# Patient Record
Sex: FEMALE | Race: WHITE | NOT HISPANIC OR LATINO | Employment: OTHER | URBAN - METROPOLITAN AREA
[De-identification: names, ages, dates, MRNs, and addresses within clinical notes are randomized per-mention and may not be internally consistent; named-entity substitution may affect disease eponyms.]

---

## 2018-05-18 ENCOUNTER — OFFICE VISIT CONVERTED (OUTPATIENT)
Dept: CARDIOLOGY | Facility: CLINIC | Age: 58
End: 2018-05-18
Attending: INTERNAL MEDICINE

## 2018-11-30 ENCOUNTER — OFFICE VISIT CONVERTED (OUTPATIENT)
Dept: CARDIOLOGY | Facility: CLINIC | Age: 58
End: 2018-11-30
Attending: INTERNAL MEDICINE

## 2019-01-12 ENCOUNTER — HOSPITAL ENCOUNTER (OUTPATIENT)
Dept: GENERAL RADIOLOGY | Facility: HOSPITAL | Age: 59
Discharge: HOME | End: 2019-01-12
Attending: FAMILY MEDICINE

## 2019-01-18 ENCOUNTER — HOSPITAL ENCOUNTER (OUTPATIENT)
Dept: OTHER | Facility: HOSPITAL | Age: 59
Discharge: HOME | End: 2019-01-18
Attending: INTERNAL MEDICINE

## 2019-01-18 LAB
ANION GAP SERPL CALC-SCNC: 15 MMOL/L (ref 8–19)
BUN SERPL-MCNC: 18 MG/DL (ref 5–25)
BUN/CREAT SERPL: 22 {RATIO} (ref 6–20)
CALCIUM SERPL-MCNC: 8.8 MG/DL (ref 8.7–10.4)
CHLORIDE SERPL-SCNC: 102 MMOL/L (ref 99–111)
CONV CO2: 29 MMOL/L (ref 22–32)
CREAT UR-MCNC: 0.81 MG/DL (ref 0.5–0.9)
GFR SERPLBLD BASED ON 1.73 SQ M-ARVRAT: >60 ML/MIN/{1.73_M2}
GLUCOSE SERPL-MCNC: 87 MG/DL (ref 65–99)
OSMOLALITY SERPL CALC.SUM OF ELEC: 295 MOSM/KG (ref 273–304)
POTASSIUM SERPL-SCNC: 3.8 MMOL/L (ref 3.5–5.3)
SODIUM SERPL-SCNC: 142 MMOL/L (ref 135–147)

## 2019-06-05 ENCOUNTER — OFFICE VISIT CONVERTED (OUTPATIENT)
Dept: CARDIOLOGY | Facility: CLINIC | Age: 59
End: 2019-06-05
Attending: INTERNAL MEDICINE

## 2019-06-05 ENCOUNTER — CONVERSION ENCOUNTER (OUTPATIENT)
Dept: CARDIOLOGY | Facility: CLINIC | Age: 59
End: 2019-06-05

## 2019-06-18 ENCOUNTER — OFFICE VISIT CONVERTED (OUTPATIENT)
Dept: PODIATRY | Facility: CLINIC | Age: 59
End: 2019-06-18
Attending: PODIATRIST

## 2019-12-10 ENCOUNTER — CONVERSION ENCOUNTER (OUTPATIENT)
Dept: CARDIOLOGY | Facility: CLINIC | Age: 59
End: 2019-12-10

## 2019-12-10 ENCOUNTER — OFFICE VISIT CONVERTED (OUTPATIENT)
Dept: CARDIOLOGY | Facility: CLINIC | Age: 59
End: 2019-12-10
Attending: INTERNAL MEDICINE

## 2020-04-15 ENCOUNTER — TELEMEDICINE CONVERTED (OUTPATIENT)
Dept: CARDIOLOGY | Facility: CLINIC | Age: 60
End: 2020-04-15
Attending: INTERNAL MEDICINE

## 2020-06-05 ENCOUNTER — OFFICE VISIT CONVERTED (OUTPATIENT)
Dept: CARDIOLOGY | Facility: CLINIC | Age: 60
End: 2020-06-05
Attending: INTERNAL MEDICINE

## 2020-06-05 ENCOUNTER — CONVERSION ENCOUNTER (OUTPATIENT)
Dept: CARDIOLOGY | Facility: CLINIC | Age: 60
End: 2020-06-05

## 2020-09-18 ENCOUNTER — CONVERSION ENCOUNTER (OUTPATIENT)
Dept: GASTROENTEROLOGY | Facility: CLINIC | Age: 60
End: 2020-09-18
Attending: INTERNAL MEDICINE

## 2020-09-21 ENCOUNTER — HOSPITAL ENCOUNTER (OUTPATIENT)
Dept: PREADMISSION TESTING | Facility: HOSPITAL | Age: 60
Discharge: HOME | End: 2020-09-21
Attending: INTERNAL MEDICINE

## 2020-09-22 LAB — SARS-COV-2 RNA SPEC QL NAA+PROBE: NOT DETECTED

## 2020-09-25 ENCOUNTER — HOSPITAL ENCOUNTER (OUTPATIENT)
Dept: GASTROENTEROLOGY | Facility: HOSPITAL | Age: 60
Setting detail: HOSPITAL OUTPATIENT SURGERY
Discharge: HOME | End: 2020-09-25
Attending: INTERNAL MEDICINE

## 2020-12-10 ENCOUNTER — TELEMEDICINE CONVERTED (OUTPATIENT)
Dept: CARDIOLOGY | Facility: CLINIC | Age: 60
End: 2020-12-10
Attending: INTERNAL MEDICINE

## 2021-05-10 NOTE — H&P
History and Physical      Patient Name: Susana Kim   Patient ID: [id number]   Sex: Female   YOB: 1960    Primary Care Provider: Adam Alcocer MD   Referring Provider: Krystina Potter MD    Visit Date: 2020    Provider: Alexei Palma MD   Location: [facility]   Location Address: [address] [place] [address]  [id number]   Location Phone: [phone]          Chief Complaint  · Surgical History and Physical  · Screening Colonoscopy      History Of Present Illness  NON-INPATIENT HISTORY AND PHYSICAL  Allergies: NO KNOWN DRUG ALLERGIES   Chief Complaint/History of Present Illness: EGD for Dysphagia and Screening Colonoscopy, History of Colon Polyps   Colon Recall: Yes How Lon years   Failed Outpatient Treatment/Contraindications: N/A   Current Medications: diltiazem HCl 180 mg oral capsule,extended release 24 hr, Eliquis 5 mg oral tablet, and Suprep Bowel Prep Kit 17.5-3.13-1.6 gram oral recon soln   Significant Past Medical History: Atrial Fibrillation, Bunion, and Hypertension   Significant Family Medical History: No Family History of Colon Cancer   Significant Past Surgical History: Cesarian Section and Colonoscopy   Previous Colonoscopy: Yes YEAR:  By Whom: Alexei Palma MD   Previous EGD: No   PHYSICAL EXAM:  Heart: Regular Rate and Rhythm   Lungs: Breathing Unlabored           Assessment  · Preoperative examination     V72.84/Z01.818  · Screening for colon cancer     V76.51/Z12.11  · Dysphagia     787.20/R13.10  · History of colon polyps     V12.72/Z86.010  · Weight loss     783.21/R63.4  · GERD (gastroesophageal reflux disease)     530.81/K21.9      Plan  · Orders  o Consent for Esophagogastroduodenoscopy (EGD) with biopsy - Possible risks/complications, benefits, and alternatives to surgical or invasive procedure have been explained to patient and/or legal guardian. - Patient has been evaluated and can tolerate anesthesia and/or sedation.  Risks, benefits, and alternatives to anesthesia and sedation have been explained to patient and/or legal guardian. (98207) - 787.20/R13.10, 783.21/R63.4, 530.81/K21.9 - 09/25/2020  o Consent for Colonoscopy Screening -Possible risk/complications, benefits, and alternatives to surgical or invasive procedure have been explained to patient and/or legal gaurdian. -Patient has been evaluated and can tolerate anethesia and/or sedation. Risk, benefits, and alternatives to anesthesia and sedation have been explained to patient or legal gaurdian. () - V12.72/Z86.010, V76.51/Z12.11 - 09/25/2020  · Medications  o Medications have been Reconciled  o Transition of Care or Provider Policy  · Instructions  o ****Surgical Orders****  o ***************  o Outpatient  o ***************  o RISK AND BENEFITS:  o Possible risks/complications, benefits and alternatives to surgical or invasive procedure have been explained to the patient and/or legal guardian.  o Patient has been evaluated and can tolerate anesthesia and/or sedation. Risks, benefits, and alternatives to anesthesia and sedation have been explained to the patient and/or legal guardian.  o ***************  o PREP: Per protocol  o IV: Per Anesthesia  o The above History and Physical Examination has been completed within 30 days of admission.  o This note has been transcribed by JOSÉ MIGUEL Redd MA. I have read and agree with the findings in this note.  o Electronically Identified Patient Education Materials Provided Electronically  · Disposition  o Call or Return if symptoms worsen or persist.            Electronically Signed by: Claudoi Redd, -Author on September 18, 2020 11:27:06 AM

## 2021-05-12 NOTE — PROGRESS NOTES
Progress Note      Patient Name: Susana Kim   Patient ID: [id number]   Sex: Female   YOB: 1960    Primary Care Provider: Adam Alcocer MD   Referring Provider: Krystina Potter MD    Visit Date: April 15, 2020    Provider: Juan Potter MD   Location: [facility]   Location Address: [address] [place] [address], [facility]   JOHNATHAN Gomez  [id number]   Location Phone: [phone]          History Of Present Illness  Video Conferencing Visit  Susana Kim is a 59 year old /White female with paroxysmal atrial fibrillation and hypertension, who has been having stable symptoms. She does report pain primarily in the knees, hip and back, which has been chronic. The patient is concerned that this occurred after she was started on Valsartan and it may be related to the medication. She reports occasional atypical chest pain as well. She is presenting for evaluation via video conferencing. Verbal consent obtained before beginning visit.   The following staff were present during this visit: Provider only.   PAST MEDICAL HISTORY: Hypertension; Paroxysmal atrial fibrillation.   FAMILY HISTORY: Negative for diabetes, hypertension and heart disease.   PSYCHOSOCIAL HISTORY: No history of mood changes or depression. She never used alcohol or tobacco.   CURRENT MEDICATIONS: include Eliquis 5 mg b.i.d; Diltiazem 180 mg daily; Valsartan 160 mg daily. The dosage and frequency of the medications were reviewed with the patient.       Review of Systems  · Cardiovascular  o Admits  o : chest pain or angina pectoris   o Denies  o : palpitations (fast, fluttering, or skipping beats), swelling (feet, ankles, hands), shortness of breath while walking or lying flat  · Respiratory  o Admits  o : chronic or frequent cough  o Denies  o : asthma or wheezing      Vitals     Per patient, at-home vitals are blood pressure 134/67, heart rate of 58.  Weight:  157.           Assessment     ASSESSMENT AND  PLAN:    1.  Joint pain:  Patient concerned this may be related to her medication.  Most recent medication added was       Valsartan.  Recommended halving for a week and then coming off of it and then monitoring her blood       pressure to see if any anti-hypertensive therapy is needed.  2.  Paroxysmal atrial fibrillation:  Patient is maintaining normal sinus rhythm. She is on Eliquis for CVA        prevention.    MD Oniel Pinedo/severiano         This note was transcribed by Cierra Kimball.  severiano/oniel   The above service was transcribed by Cierra Kimball, and I attest to the accuracy of the note.  ONIEL.               Electronically Signed by: Cierra Kimball-, -Author on April 23, 2020 04:10:52 AM  Electronically Co-signed by: Juan Potter MD -Reviewer on April 23, 2020 12:53:23 PM

## 2021-05-13 NOTE — PROGRESS NOTES
"   Progress Note      Patient Name: Susana Kim   Patient ID: [id number]   Sex: Female   YOB: 1960    Primary Care Provider: Adam Alcocer MD   Referring Provider: Krystina Potter MD    Visit Date: June 5, 2020    Provider: Juan Potter MD   Location: [facility]   Location Address: [address] [place] [address], [facility]   JOHNATHAN Gomez  [id number]   Location Phone: [phone]          Chief Complaint     Afib.       History Of Present Illness  REFERRING CARE PROVIDER: Krystina Potter MD   Susana Kim is a 59 year old /White female with paroxysmal atrial fibrillation and hypertension who has been doing well. She has not had any new complaints, intermittent palpitations occasionally.   PAST MEDICAL HISTORY: Hypertension; Paroxysmal atrial fibrillation.   FAMILY HISTORY: Negative for diabetes mellitus, hypertension, or heart disease.   PSYCHOSOCIAL HISTORY: Denies alcohol or tobacco use. Walks for exercise. Denies mood changes or depression.   CURRENT MEDICATIONS: Eliquis 5 mg b.i.d.; Cartia  mg daily; multivitamin; calcium. Dosage and frequency of the medications reviewed with the patient.       Review of Systems  · Cardiovascular  o Admits  o : palpitations (fast, fluttering, or skipping beats)  o Denies  o : swelling (feet, ankles, hands), shortness of breath while walking or lying flat, chest pain or angina pectoris   · Respiratory  o Denies  o : chronic or frequent cough, asthma or wheezing      Vitals  Date Time BP Position Site L\R Cuff Size HR RR TEMP (F) WT  HT  BMI kg/m2 BSA m2 O2 Sat HC       06/05/2020 12:27 /78 Sitting    58 - R   149lbs 0oz 5'  7\" 23.34 1.79     06/05/2020 12:27 /76 Sitting    56 - R                 Physical Examination  · Constitutional  o Appearance  o : Awake, alert, in no acute distress.   · Eyes  o Conjunctivae  o : Normal.  · Ears, Nose, Mouth and Throat  o Oral Cavity  o :   § Oral Mucosa  § : " Normal.  · Neck  o Inspection/Palpation  o : No JVD. Good carotid upstroke. No thyromegaly.  · Respiratory  o Respiratory  o : Good respiratory effort. Clear to percussion and auscultation.  · Cardiovascular  o Heart  o :   § Auscultation of Heart  § : S1, S2 normal. Regular rate and rhythm without murmurs, gallops, or rubs.  o Peripheral Vascular System  o :   § Extremities  § : Good femoral and pedal pulses. No pedal edema.  · Gastrointestinal  o Abdominal Examination  o : Soft. No tenderness or masses felt. No hepatosplenomegaly. Abdominal aorta is not palpable.          Assessment     ASSESSMENT & PLAN:    Atrial fibrillation, paroxysmal, maintaining normal sinus rhythm and on Eliquis for CVA prevention.             Electronically Signed by: Elvira Segovia-, Other -Author on June 11, 2020 01:45:19 PM  Electronically Co-signed by: Juan Potter MD -Reviewer on June 17, 2020 05:51:30 PM

## 2021-05-14 NOTE — PROGRESS NOTES
"   Progress Note      Patient Name: Susana Kim   Patient ID: [id number]   Sex: Female   YOB: 1960    Primary Care Provider: Adam Alcocer MD   Referring Provider: Krystina Potter MD    Visit Date: December 10, 2020    Provider: Juan Potter MD   Location: [facility]   Location Address: [address] [place] [address], [address]   JOHNATHAN Gomez  [id number]   Location Phone: [phone]          Chief Complaint     Atrial fibrillation.       History Of Present Illness  Video Conferencing Visit  Susana Kim is a 60 year old /White female with paroxysmal atrial fibrillation and hypertension who has been doing well at home. She has not had any significant recurrent tachycardia issues. Denies chest pain or shortness of breath. Tolerating her medications well. Evaluation via video conferencing. Verbal consent obtained before beginning visit.   The following staff were present during this visit: Provider only.      PAST MEDICAL HISTORY: Mild hypertension; Paroxysmal atrial fibrillation.     CURRENT MEDICATIONS:  Eliquis 5 mg b.i.d.; Cartia  mg daily; omeprazole 40 mg daily; multivitamin daily; calcium daily; glucosamine daily.    ALLERGIES:  No known drug allergies.           Vitals     Per patient, at-home vitals:   /62.  Heart rate 55.  Height 5'7\".           Assessment     ASSESSMENT & PLAN:    Paroxysmal atrial fibrillation.  Patient symptomatically maintaining normal sinus rhythm and on Eliquis for CVA prevention.  Continued to encourage exercise.             Electronically Signed by: Elvira Segovia-, Other -Author on December 18, 2020 11:03:46 AM  Electronically Co-signed by: Juan Potter MD -Reviewer on December 18, 2020 03:13:15 PM  "

## 2021-05-15 VITALS
HEIGHT: 67 IN | BODY MASS INDEX: 24.17 KG/M2 | SYSTOLIC BLOOD PRESSURE: 126 MMHG | DIASTOLIC BLOOD PRESSURE: 58 MMHG | WEIGHT: 154 LBS | HEART RATE: 58 BPM

## 2021-05-15 VITALS
HEIGHT: 67 IN | WEIGHT: 149 LBS | DIASTOLIC BLOOD PRESSURE: 78 MMHG | HEART RATE: 58 BPM | SYSTOLIC BLOOD PRESSURE: 150 MMHG | BODY MASS INDEX: 23.39 KG/M2

## 2021-05-15 VITALS
DIASTOLIC BLOOD PRESSURE: 59 MMHG | WEIGHT: 155 LBS | BODY MASS INDEX: 24.33 KG/M2 | HEIGHT: 67 IN | SYSTOLIC BLOOD PRESSURE: 115 MMHG | HEART RATE: 65 BPM | OXYGEN SATURATION: 99 %

## 2021-05-15 VITALS
HEART RATE: 56 BPM | DIASTOLIC BLOOD PRESSURE: 62 MMHG | SYSTOLIC BLOOD PRESSURE: 126 MMHG | BODY MASS INDEX: 24.48 KG/M2 | WEIGHT: 156 LBS | HEIGHT: 67 IN

## 2021-05-16 VITALS
HEIGHT: 67 IN | DIASTOLIC BLOOD PRESSURE: 70 MMHG | SYSTOLIC BLOOD PRESSURE: 144 MMHG | WEIGHT: 152 LBS | HEART RATE: 56 BPM | BODY MASS INDEX: 23.86 KG/M2

## 2021-05-16 VITALS
SYSTOLIC BLOOD PRESSURE: 138 MMHG | HEART RATE: 50 BPM | WEIGHT: 153 LBS | DIASTOLIC BLOOD PRESSURE: 74 MMHG | HEIGHT: 67 IN | BODY MASS INDEX: 24.01 KG/M2

## 2021-08-16 PROBLEM — I10 ESSENTIAL HYPERTENSION: Status: ACTIVE | Noted: 2021-08-16

## 2021-08-16 PROBLEM — I48.0 PAF (PAROXYSMAL ATRIAL FIBRILLATION): Status: ACTIVE | Noted: 2021-08-16

## 2021-08-16 NOTE — PROGRESS NOTES
"Chief Complaint  Follow-up (6 mos with EKG) and Atrial Fibrillation    Subjective    Patient has had some heart pounding tachycardia episodes which she did record on her home telemetry strips which showed normal sinus rhythm.  Do not report any other active complaints or symptoms    Past Medical History:   Diagnosis Date   • Atrial fibrillation (CMS/HCC)    • Bunion    • Essential hypertension 8/16/2021   • Hypertension    • PAF (paroxysmal atrial fibrillation) (CMS/HCC) 8/16/2021         Current Outpatient Medications:   •  Calcium 500-125 MG-UNIT tablet, Calcium 500 + D (D3) 500 mg(1,250mg) -125 unit oral tablet take 1 tablet by oral route daily   Suspended, Disp: , Rfl:   •  dilTIAZem CD (CARDIZEM CD) 180 MG 24 hr capsule, Take 180 mg by mouth Daily., Disp: , Rfl:   •  Eliquis 5 MG tablet tablet, Take 5 mg by mouth 2 (Two) Times a Day., Disp: , Rfl:   •  multivitamin (multivitamin) tablet tablet, Take  by mouth Daily., Disp: , Rfl:   •  Omega-3 Fatty Acids (OMEGA 3 500 PO), Take  by mouth., Disp: , Rfl:     There are no discontinued medications.  No Known Allergies     Social History     Tobacco Use   • Smoking status: Never Smoker   • Smokeless tobacco: Never Used   Vaping Use   • Vaping Use: Never used   Substance Use Topics   • Alcohol use: Not Currently   • Drug use: Never       Family History   Family history unknown: Yes        Objective     /52   Pulse 62   Ht 170.2 cm (67\")   Wt 69.9 kg (154 lb)   BMI 24.12 kg/m²       Physical Exam    General Appearance:   · no acute distress  · Alert and oriented x3  HENT:   · lips not cyanotic  · Atraumatic  Neck:  · No jvd   · supple  Respiratory:  · no respiratory distress  · normal breath sounds  · no rales  Cardiovascular:  · Regular rate and rhythm  · no S3, no S4   · no murmur  · no rub  Extremities  · No cyanosis  · lower extremity edema: none    Skin:   · warm, dry  · No rashes      Result Review :     No results found for: PROBNP       No results " found for: TSH   No results found for: FREET4   No results found for: DDIMERQUANT  No results found for: MG   No results found for: DIGOXIN   No results found for: TROPONINT          No results found for: POCTROP         ECG 12 Lead    Date/Time: 8/17/2021 1:39 PM  Performed by: Juan Potter MD  Authorized by: Juan Potter MD   Comparison: compared with previous ECG   Rhythm: sinus rhythm  Ectopy: atrial premature contractions                   Diagnoses and all orders for this visit:    1. PAF (paroxysmal atrial fibrillation) (CMS/HCC) (Primary)  Assessment & Plan:  Patient maintain normal sinus rhythm on EKG continue with diltiazem 180 for rate control Eliquis 5 twice daily for CVA prevention.  Counseled on importance of exercise      2. Essential hypertension  Assessment & Plan:  Blood pressure within range on diltiazem 180.  Counseled patient on  • low-sodium diet of less than 2 g  • Aerobic activity 30 minutes a day 5 times a week  • Weight loss          Other orders  -     ECG 12 Lead          Follow Up     Return in about 6 months (around 2/17/2022).          Patient was given instructions and counseling regarding her condition or for health maintenance advice. Please see specific information pulled into the AVS if appropriate.

## 2021-08-17 ENCOUNTER — OFFICE VISIT (OUTPATIENT)
Dept: CARDIOLOGY | Facility: CLINIC | Age: 61
End: 2021-08-17

## 2021-08-17 VITALS
WEIGHT: 154 LBS | HEART RATE: 62 BPM | HEIGHT: 67 IN | BODY MASS INDEX: 24.17 KG/M2 | DIASTOLIC BLOOD PRESSURE: 52 MMHG | SYSTOLIC BLOOD PRESSURE: 138 MMHG

## 2021-08-17 DIAGNOSIS — I10 ESSENTIAL HYPERTENSION: ICD-10-CM

## 2021-08-17 DIAGNOSIS — I48.0 PAF (PAROXYSMAL ATRIAL FIBRILLATION) (HCC): Primary | ICD-10-CM

## 2021-08-17 PROCEDURE — 99214 OFFICE O/P EST MOD 30 MIN: CPT | Performed by: INTERNAL MEDICINE

## 2021-08-17 PROCEDURE — 93000 ELECTROCARDIOGRAM COMPLETE: CPT | Performed by: INTERNAL MEDICINE

## 2021-08-17 RX ORDER — DILTIAZEM HYDROCHLORIDE 180 MG/1
180 CAPSULE, COATED, EXTENDED RELEASE ORAL DAILY
COMMUNITY
Start: 2021-08-09 | End: 2021-12-15

## 2021-08-17 RX ORDER — DIPHENOXYLATE HYDROCHLORIDE AND ATROPINE SULFATE 2.5; .025 MG/1; MG/1
TABLET ORAL DAILY
COMMUNITY
End: 2021-12-06

## 2021-08-17 RX ORDER — APIXABAN 5 MG/1
5 TABLET, FILM COATED ORAL 2 TIMES DAILY
COMMUNITY
Start: 2021-08-09 | End: 2021-12-15

## 2021-08-17 NOTE — ASSESSMENT & PLAN NOTE
Patient maintain normal sinus rhythm on EKG continue with diltiazem 180 for rate control Eliquis 5 twice daily for CVA prevention.  Counseled on importance of exercise

## 2021-08-17 NOTE — ASSESSMENT & PLAN NOTE
Blood pressure within range on diltiazem 180.  Counseled patient on  • low-sodium diet of less than 2 g  • Aerobic activity 30 minutes a day 5 times a week  • Weight loss

## 2021-12-06 ENCOUNTER — OFFICE VISIT (OUTPATIENT)
Dept: INTERNAL MEDICINE | Facility: CLINIC | Age: 61
End: 2021-12-06

## 2021-12-06 VITALS
TEMPERATURE: 97.8 F | SYSTOLIC BLOOD PRESSURE: 122 MMHG | HEIGHT: 67 IN | RESPIRATION RATE: 18 BRPM | HEART RATE: 76 BPM | WEIGHT: 155 LBS | DIASTOLIC BLOOD PRESSURE: 72 MMHG | BODY MASS INDEX: 24.33 KG/M2 | OXYGEN SATURATION: 96 %

## 2021-12-06 DIAGNOSIS — Z00.00 ANNUAL PHYSICAL EXAM: ICD-10-CM

## 2021-12-06 DIAGNOSIS — I10 ESSENTIAL HYPERTENSION: ICD-10-CM

## 2021-12-06 DIAGNOSIS — Z12.31 VISIT FOR SCREENING MAMMOGRAM: ICD-10-CM

## 2021-12-06 DIAGNOSIS — M79.651 RIGHT THIGH PAIN: Primary | ICD-10-CM

## 2021-12-06 DIAGNOSIS — I48.0 PAF (PAROXYSMAL ATRIAL FIBRILLATION) (HCC): ICD-10-CM

## 2021-12-06 LAB
BASOPHILS # BLD AUTO: 0.06 10*3/MM3 (ref 0–0.2)
BASOPHILS NFR BLD AUTO: 0.8 % (ref 0–1.5)
DEPRECATED RDW RBC AUTO: 43 FL (ref 37–54)
EOSINOPHIL # BLD AUTO: 0.13 10*3/MM3 (ref 0–0.4)
EOSINOPHIL NFR BLD AUTO: 1.7 % (ref 0.3–6.2)
ERYTHROCYTE [DISTWIDTH] IN BLOOD BY AUTOMATED COUNT: 12 % (ref 12.3–15.4)
HCT VFR BLD AUTO: 42.5 % (ref 34–46.6)
HGB BLD-MCNC: 14 G/DL (ref 12–15.9)
IMM GRANULOCYTES # BLD AUTO: 0.02 10*3/MM3 (ref 0–0.05)
IMM GRANULOCYTES NFR BLD AUTO: 0.3 % (ref 0–0.5)
LYMPHOCYTES # BLD AUTO: 1.83 10*3/MM3 (ref 0.7–3.1)
LYMPHOCYTES NFR BLD AUTO: 23.5 % (ref 19.6–45.3)
MCH RBC QN AUTO: 31.7 PG (ref 26.6–33)
MCHC RBC AUTO-ENTMCNC: 32.9 G/DL (ref 31.5–35.7)
MCV RBC AUTO: 96.4 FL (ref 79–97)
MONOCYTES # BLD AUTO: 0.66 10*3/MM3 (ref 0.1–0.9)
MONOCYTES NFR BLD AUTO: 8.5 % (ref 5–12)
NEUTROPHILS NFR BLD AUTO: 5.08 10*3/MM3 (ref 1.7–7)
NEUTROPHILS NFR BLD AUTO: 65.2 % (ref 42.7–76)
NRBC BLD AUTO-RTO: 0 /100 WBC (ref 0–0.2)
PLATELET # BLD AUTO: 281 10*3/MM3 (ref 140–450)
PMV BLD AUTO: 11.3 FL (ref 6–12)
RBC # BLD AUTO: 4.41 10*6/MM3 (ref 3.77–5.28)
WBC NRBC COR # BLD: 7.78 10*3/MM3 (ref 3.4–10.8)

## 2021-12-06 PROCEDURE — 99386 PREV VISIT NEW AGE 40-64: CPT | Performed by: INTERNAL MEDICINE

## 2021-12-06 PROCEDURE — 80053 COMPREHEN METABOLIC PANEL: CPT | Performed by: INTERNAL MEDICINE

## 2021-12-06 PROCEDURE — 80061 LIPID PANEL: CPT | Performed by: INTERNAL MEDICINE

## 2021-12-06 PROCEDURE — 85025 COMPLETE CBC W/AUTO DIFF WBC: CPT | Performed by: INTERNAL MEDICINE

## 2021-12-06 PROCEDURE — 84443 ASSAY THYROID STIM HORMONE: CPT | Performed by: INTERNAL MEDICINE

## 2021-12-06 PROCEDURE — 99214 OFFICE O/P EST MOD 30 MIN: CPT | Performed by: INTERNAL MEDICINE

## 2021-12-06 NOTE — PROGRESS NOTES
"Chief Complaint  Hip Pain (Right, radiates down the leg) and Ear Problem    Subjective          Susana Teri Kim presents to [facility]  History of Present Illness    Verbal consent was obtained to record the visit using GAGE.     Patient presents to establish care and for physical.     Right hip and right lower extremity pain   The patient notes she likes walking at [facility]; however, she reports her right hip and lower extremity have been causing pain. She reports it starts in her right hip just inferior to the buttock region, and is not sure if it is a muscle or bone ache, and the upper posterior portion of her right lower extremity will hurt. The patient is able to bend but has to squat to  something off the ground. She denies an injury or falls, but does relate seeing a chiropractor who tried bending her leg over her head. The patient denies pain in her back. She takes ibuprofen with only minimal pain relief, and has not tried heat or ice. The patient notes she has been riding her bicycle because it has been easier than walking.     Cardiology  The patient sees a cardiologist for her atrial fibrillation and hypertension. Her blood pressure is good today.    Fatigue  She notes she feels tired all of the time.     Ear  The patient notes for approximately 2 months having a \"good bit\" of blood on the Q-Tip when she cleaned her ear, but has not seen any blood in the last 2 weeks.    Health maintenance  She notes that she is in need of a mammogram. She states that she has received the COVID-19 vaccines in 07/2021 and 08/2021. She reports receiving the influenza vaccine 1 time.     Objective   Vital Signs:   /72 (BP Location: Left arm, Patient Position: Sitting, Cuff Size: Adult)   Pulse 76   Temp 97.8 °F (36.6 °C) (Temporal)   Resp 18   Ht 170.2 cm (67\")   Wt 70.3 kg (155 lb)   SpO2 96%   BMI 24.28 kg/m²     Physical Exam  Vitals reviewed. "   Constitutional:       Appearance: Normal appearance. She is well-developed.   HENT:      Head: Normocephalic and atraumatic.      Ears:      Comments: Normal exam     Mouth/Throat:      Pharynx: No oropharyngeal exudate.   Eyes:      Conjunctiva/sclera: Conjunctivae normal.      Pupils: Pupils are equal, round, and reactive to light.   Cardiovascular:      Rate and Rhythm: Normal rate and regular rhythm.      Heart sounds: No murmur heard.  No friction rub. No gallop.    Pulmonary:      Effort: Pulmonary effort is normal.      Breath sounds: Normal breath sounds. No wheezing or rhonchi.   Skin:     General: Skin is warm and dry.   Neurological:      Mental Status: She is alert and oriented to person, place, and time.   Psychiatric:         Mood and Affect: Affect normal.          Result Review :            Procedures        Assessment and Plan    Diagnoses and all orders for this visit:    1. Right thigh pain (Primary)  Discussed that symptoms sound soft tissue related rather than joint/bone related  Recommend trial of physical therapy, application of heat/ice.   -     Ambulatory Referral to Physical Therapy Evaluate and treat    2. Annual physical exam  Screening labs reviewed/ordered  Counseling provided regarding age appropriate screenings and immunizations, healthy diet and exercise.   -     Comprehensive Metabolic Panel  -     CBC & Differential  -     TSH  -     Lipid Panel    3. Visit for screening mammogram        -     Mammo Screening Digital Tomosynthesis Bilateral With CAD; Future    4. Atrial fibrillation and hypertension         -  She is followed by her cardiologist. Her blood pressure is good today.         Follow Up   Return in about 1 year (around 12/6/2022) for Annual physical.  Patient was given instructions and counseling regarding her condition or for health maintenance advice. Please see specific information pulled into the AVS if appropriate.       Transcribed from ambient dictation for  Pooja Nickerson MD by Reny Del Rosario   12/06/21   17:05 EST    Patient verbalized consent to the visit recording.  I have personally performed the services described in this document as transcribed by the above individual, and it is both accurate and complete.  Pooja Nickerson MD  12/9/2021  10:20 EST

## 2021-12-07 LAB
ALBUMIN SERPL-MCNC: 4.9 G/DL (ref 3.5–5.2)
ALBUMIN/GLOB SERPL: 1.8 G/DL
ALP SERPL-CCNC: 96 U/L (ref 39–117)
ALT SERPL W P-5'-P-CCNC: 19 U/L (ref 1–33)
ANION GAP SERPL CALCULATED.3IONS-SCNC: 13.6 MMOL/L (ref 5–15)
AST SERPL-CCNC: 23 U/L (ref 1–32)
BILIRUB SERPL-MCNC: 0.3 MG/DL (ref 0–1.2)
BUN SERPL-MCNC: 16 MG/DL (ref 8–23)
BUN/CREAT SERPL: 25 (ref 7–25)
CALCIUM SPEC-SCNC: 9.6 MG/DL (ref 8.6–10.5)
CHLORIDE SERPL-SCNC: 101 MMOL/L (ref 98–107)
CHOLEST SERPL-MCNC: 234 MG/DL (ref 0–200)
CO2 SERPL-SCNC: 27.4 MMOL/L (ref 22–29)
CREAT SERPL-MCNC: 0.64 MG/DL (ref 0.57–1)
GFR SERPL CREATININE-BSD FRML MDRD: 94 ML/MIN/1.73
GLOBULIN UR ELPH-MCNC: 2.7 GM/DL
GLUCOSE SERPL-MCNC: 99 MG/DL (ref 65–99)
HDLC SERPL-MCNC: 58 MG/DL (ref 40–60)
LDLC SERPL CALC-MCNC: 164 MG/DL (ref 0–100)
LDLC/HDLC SERPL: 2.8 {RATIO}
POTASSIUM SERPL-SCNC: 4 MMOL/L (ref 3.5–5.2)
PROT SERPL-MCNC: 7.6 G/DL (ref 6–8.5)
SODIUM SERPL-SCNC: 142 MMOL/L (ref 136–145)
TRIGL SERPL-MCNC: 68 MG/DL (ref 0–150)
TSH SERPL DL<=0.05 MIU/L-ACNC: 1.49 UIU/ML (ref 0.27–4.2)
VLDLC SERPL-MCNC: 12 MG/DL (ref 5–40)

## 2021-12-08 ENCOUNTER — PATIENT ROUNDING (BHMG ONLY) (OUTPATIENT)
Dept: INTERNAL MEDICINE | Facility: CLINIC | Age: 61
End: 2021-12-08

## 2021-12-08 NOTE — PROGRESS NOTES
December 8, 2021    Hello, may I speak with Susana Kim?    My name is Lisa      I am  with [facility]  [address] [id number] [phone].    Before we get started may I verify your date of birth? 1960    I am calling to officially welcome you to our practice and ask about your recent visit. Is this a good time to talk? yes    Tell me about your visit with us. What things went well?  Pt stated that everything went well and she was very pleased with her visit.       We're always looking for ways to make our patients' experiences even better. Do you have recommendations on ways we may improve?  no    Overall were you satisfied with your first visit to our practice? yes       I appreciate you taking the time to speak with me today. Is there anything else I can do for you? no      Thank you, and have a great day.

## 2021-12-15 RX ORDER — DILTIAZEM HYDROCHLORIDE 180 MG/1
CAPSULE, COATED, EXTENDED RELEASE ORAL
Qty: 90 CAPSULE | Refills: 2 | Status: SHIPPED | OUTPATIENT
Start: 2021-12-15 | End: 2022-08-22 | Stop reason: SDUPTHER

## 2021-12-15 RX ORDER — APIXABAN 5 MG/1
TABLET, FILM COATED ORAL
Qty: 180 TABLET | Refills: 2 | Status: SHIPPED | OUTPATIENT
Start: 2021-12-15 | End: 2022-08-22 | Stop reason: SDUPTHER

## 2022-01-12 ENCOUNTER — TELEPHONE (OUTPATIENT)
Dept: INTERNAL MEDICINE | Facility: CLINIC | Age: 62
End: 2022-01-12

## 2022-01-12 ENCOUNTER — OFFICE VISIT (OUTPATIENT)
Dept: INTERNAL MEDICINE | Facility: CLINIC | Age: 62
End: 2022-01-12

## 2022-01-12 VITALS
BODY MASS INDEX: 24.33 KG/M2 | DIASTOLIC BLOOD PRESSURE: 64 MMHG | TEMPERATURE: 97.8 F | WEIGHT: 155 LBS | RESPIRATION RATE: 18 BRPM | HEART RATE: 78 BPM | HEIGHT: 67 IN | SYSTOLIC BLOOD PRESSURE: 118 MMHG | OXYGEN SATURATION: 98 %

## 2022-01-12 DIAGNOSIS — R06.2 WHEEZING: ICD-10-CM

## 2022-01-12 DIAGNOSIS — R05.9 COUGH: Primary | ICD-10-CM

## 2022-01-12 DIAGNOSIS — R50.9 FEVER, UNSPECIFIED FEVER CAUSE: ICD-10-CM

## 2022-01-12 LAB
EXPIRATION DATE: NORMAL
INTERNAL CONTROL: NORMAL
Lab: NORMAL
SARS-COV-2 AG UPPER RESP QL IA.RAPID: NOT DETECTED

## 2022-01-12 PROCEDURE — 87426 SARSCOV CORONAVIRUS AG IA: CPT | Performed by: INTERNAL MEDICINE

## 2022-01-12 PROCEDURE — 99214 OFFICE O/P EST MOD 30 MIN: CPT | Performed by: INTERNAL MEDICINE

## 2022-01-12 RX ORDER — DOXYCYCLINE HYCLATE 100 MG/1
100 CAPSULE ORAL 2 TIMES DAILY
Qty: 14 CAPSULE | Refills: 0 | Status: SHIPPED | OUTPATIENT
Start: 2022-01-12 | End: 2022-01-19

## 2022-01-12 RX ORDER — ALBUTEROL SULFATE 90 UG/1
2 AEROSOL, METERED RESPIRATORY (INHALATION) EVERY 4 HOURS PRN
Qty: 18 G | Refills: 0 | Status: SHIPPED | OUTPATIENT
Start: 2022-01-12 | End: 2022-08-22

## 2022-01-12 NOTE — TELEPHONE ENCOUNTER
Caller: Susana Kim    Relationship to patient: Self    Best call back number: [phone]    Patient is needing: PATIENT CALLED STATING SHE WAS DIAGNOSED WITH PNEUMONIA TODAY AND WOULD LIKE TO KNOW WHY SHE WAS ONLY PRESCRIBED AN ALBUTEROL INHALER. THE PATIENT WOULD LIKE A CALL BACK TO SPEAK WITH CLINICAL STAFF PLEASE ADVISE THANK YOU.         [address] [facility] - [phone]  - [phone]   [phone]

## 2022-01-12 NOTE — PROGRESS NOTES
"Chief Complaint  Fever (100), Cough (11/16/21- quarantined twice), and Hypertension (Last Night)    Subjective          Susana Kim presents to [facility]  Cough  This is a recurrent problem. The current episode started more than 1 month ago (Patient reports this cough started around November 16 has been off and on since then.). The problem has been waxing and waning. The problem occurs hourly. The cough is productive of sputum. Associated symptoms include a fever and headaches. Associated symptoms comments: Fever is off/on. Highest reported fever 100. Fever is relieved with medication . She has tried OTC cough suppressant for the symptoms. There is no history of asthma or COPD.     Patient also reports a episode of elevated blood pressure last night.  She reported her blood pressure was in the 150s.  She stated she felt it was elevated because she was starting to get a headache.  She reports she routinely takes her blood pressure and this is the only time it has been elevated that high.  She reports taking her medication as prescribed.    Patient denies any other symptoms other than cough at this time.      Objective   Vital Signs:   /64 (BP Location: Right arm, Patient Position: Sitting, Cuff Size: Adult)   Pulse 78   Temp 97.8 °F (36.6 °C) (Temporal)   Resp 18   Ht 170.2 cm (67\")   Wt 70.3 kg (155 lb)   SpO2 98%   BMI 24.28 kg/m²     Physical Exam  Constitutional:       General: She is not in acute distress.     Appearance: Normal appearance. She is normal weight.   HENT:      Nose: Nose normal.      Mouth/Throat:      Mouth: Mucous membranes are moist.      Pharynx: Oropharynx is clear.   Cardiovascular:      Rate and Rhythm: Normal rate and regular rhythm.      Heart sounds: Normal heart sounds.   Pulmonary:      Breath sounds: Examination of the right-middle field reveals wheezing. Examination of the left-middle field reveals wheezing. " Examination of the right-lower field reveals decreased breath sounds. Examination of the left-lower field reveals decreased breath sounds. Decreased breath sounds and wheezing present.      Comments: Patient actively coughing in exam room.  Cough is deep, barky in style, and productive per patient.  Crackles were heard bilaterally with auscultation posteriorly.  Musculoskeletal:      Cervical back: Normal range of motion and neck supple.   Neurological:      Mental Status: She is alert.        Result Review :         Data reviewed: Radiologic studies chest xray performed in office today           Assessment and Plan    Diagnoses and all orders for this visit:    1. Cough (Primary)  Comments:  Swabbed for COVID-19 during this visit in office.  We will also obtain chest x-ray in office and proceed with appropriate treatment after results.  Orders:  -     XR Chest PA & Lateral (In Office)  -     albuterol sulfate  (90 Base) MCG/ACT inhaler; Inhale 2 puffs Every 4 (Four) Hours As Needed for Wheezing or Shortness of Air (cough).  Dispense: 18 g; Refill: 0    2. Fever, unspecified fever cause  -     XR Chest PA & Lateral (In Office)    3. Wheezing  Comments:  Prescribed albuterol inhaler for wheezing upon exam.  Instructed to use inhaler for significant coughing episodes as well as shortness of breath.   Orders:  -     albuterol sulfate  (90 Base) MCG/ACT inhaler; Inhale 2 puffs Every 4 (Four) Hours As Needed for Wheezing or Shortness of Air (cough).  Dispense: 18 g; Refill: 0    Patient denies a history of any lung problems prior to coughing.  Discussed differential diagnoses for coughing which includes bacterial versus viral pneumonia, as well as acute bronchitis, or acute viral illness.  COVID swab in office today was negative.  Will obtain chest x-ray in office to rule out pneumonia.  Patient's exam was remarkable for wheezing therefore an albuterol inhaler has been sent to the pharmacy for use for  wheezing or shortness of air episodes.  Discussed with patient that her chest x-ray results would be relayed to her as well as the treatment plan.  Treatment plan includes antibiotic treatment for pneumonia.  If chest x-ray is inconclusive for pneumonia symptomatic management will be suggested.  This includes possibly medicated cough syrup as she describes the coughing is worse at night and keeps her awake.  Suggested Coricidin OTC as it is blood pressure friendly and can help with other symptom management if those were to occur.  If fever returns and is unable to be relieved with Tylenol/Motrin to call the office.  Discussed if increase shortness of breath or chest pain occurs to visit the emergency department.  Discussed further treatment may be needed based on the results of the chest x-ray; other treatment includes blood work.   Also discussed the single episode of elevated blood pressure.  Discussed that this could be related to acute illness and/or medication use.  Patient assured that she routinely checks her blood pressure at home and was educated on when to call the office if her blood pressure readings continue to stay elevated.  Patient reports she routinely follows up with cardiology.    1/12/22 12:01pm - Called patient and discussed findings on xray performed in office. Discussed antibiotics will be sent to pharmacy for her to take for the next 7 days for pneumonia. Discussed calling the office if significantly not better by Friday. Additionally, if symptoms worsen to call the office. Also discussed the calcified granulomas found on the xray - the report indicated those were unchanged.     I spent 30 minutes caring for Susana on this date of service. This time includes time spent by me in the following activities:preparing for the visit, reviewing tests, obtaining and/or reviewing a separately obtained history, performing a medically appropriate examination and/or evaluation , counseling and educating  the patient/family/caregiver, ordering medications, tests, or procedures, referring and communicating with other health care professionals , documenting information in the medical record and independently interpreting results and communicating that information with the patient/family/caregiver  Follow Up   Return if symptoms worsen or fail to improve.  Patient was given instructions and counseling regarding her condition or for health maintenance advice. Please see specific information pulled into the AVS if appropriate.

## 2022-01-12 NOTE — PATIENT INSTRUCTIONS
"American Journal for Respiratory Critical Care Medicine, 190, P5-P6. Retrieved from https://www.thoracic.org/patients/patient-resources/resources/metered-dose-inhaler-mdi.pdf\">   How to Use a Metered Dose Inhaler    A metered dose inhaler (MDI) is a handheld device filled with medicine that must be breathed into the lungs (inhaled). The medicine is delivered by pushing down on a metal canister. This releases a preset amount of spray and mist through the mouth and into the lungs. Each MDI canister holds a certain number of doses (puffs).  Using a spacer with a metered dose inhaler may be recommended to help get more medicine into the lungs. A spacer is a plastic tube that connects to the MDI on one end and has a mouthpiece on the other end. A spacer holds the medicine in the tube for a short time. This allows more medicine to be inhaled.  The MDI can be used to deliver many kinds of inhaled medicines, including:  · Quick relief or rescue medicines, such as bronchodilators.  · Controller medicines, such as corticosteroids.  What are the risks?  · If you do not use your inhaler correctly, medicine might not reach your lungs to help you breathe.  · If you do not have enough strength to push down the canister to make it spray, ask your health care provider for ways to help.  · The medicine in the MDI may cause side effects, such as:  ? Mouth sores (thrush).  ? Cough.  ? Hoarseness.  ? Shakiness.  ? Headache.  Supplies needed:  · A metered dose inhaler.  · A spacer, if recommended.  How to use a metered dose inhaler without a spacer    1. Remove the cap from the inhaler.  2. If you are using the inhaler for the first time, shake it for 5 seconds, turn it away from your face, then release 4 puffs into the air. This is called priming.  3. Shake the inhaler for 5 seconds.  4. Position the inhaler so the top of the canister faces up.  5. Put your index finger on the top of the medicine canister. Support the bottom of the " inhaler with your thumb.  6. Breathe out normally and as completely as possible, away from the inhaler.  7. Either place the inhaler between your teeth and close your lips tightly around the mouthpiece, or hold the inhaler 1-2 inches (2.5-5 cm) away from your open mouth. Keep your tongue down out of the way. If you are unsure which technique to use, ask your health care provider.  8. Press the canister down with your index finger to release the medicine. Inhale deeply and slowly through your mouth until your lungs are completely filled. Do not breathe in through your nose. Inhaling should take 4-6 seconds.  9. Hold the medicine in your lungs for 5-10 seconds (10 seconds is best). This helps the medicine get into the small airways of your lungs.  10. Remove the inhaler from your mouth, turn your head, and breathe out normally.  11. Wait about 1 minute between puffs or as directed. Then repeat steps 3-10 until you have taken the number of puffs that your health care provider directed.  12. Put the cap on the inhaler.  13. If you are using a steroid inhaler, rinse your mouth with water, gargle, and spit out the water. Do not swallow the water.  How to use a metered dose inhaler with a spacer    1. Remove the cap from the inhaler.  2. If you are using the inhaler for the first time, shake it for 5 seconds, turn it away from your face, then release 4 puffs into the air. This is called priming.  3. Shake the inhaler for 5 seconds.  4. Place the open end of the spacer onto the inhaler mouthpiece.  5. Position the inhaler so the top of the canister faces up and the spacer mouthpiece faces you.  6. Put your index finger on the top of the medicine canister. Support the bottom of the inhaler and the spacer with your thumb.  7. Breathe out normally and as completely as possible, away from the spacer.  8. Place the spacer between your teeth and close your lips tightly around it. Keep your tongue down out of the way.  9. Press  the canister down with your index finger to release the medicine, then inhale deeply and slowly through your mouth until your lungs are completely filled. Do not breathe in through your nose. Inhaling should take 4-6 seconds.  10. Hold the medicine in your lungs for 5-10 seconds (10 seconds is best). This helps the medicine get into the small airways of your lungs.  11. Remove the spacer from your mouth, turn your head, and breathe out normally.  12. Wait about 1 minute between puffs or as directed. Then repeat steps 3-11 until you have taken the number of puffs that your health care provider directed.  13. Remove the spacer from the inhaler and put the cap on the inhaler.  14. If you are using a steroid inhaler, rinse your mouth with water, gargle, and spit out the water. Do not swallow the water.  Follow these instructions at home:  Caring for your MDI  · Store your inhaler at or near room temperature. A cold MDI will not work properly.  · Follow directions on the package insert for care and cleaning of your MDI and spacer.  General instructions  · Take your inhaled medicine only as told by your health care provider. Do not use the inhaler more than directed by your health care provider.  · Refill your MDI with medicine before all the preset doses have been used.  ? If your inhaler has a counter, check it to determine how full your MDI is. The number you see tells you how many doses are left.  ? If your inhaler does not have a counter, ask your health care provider when you will need to refill it. Then write the refill date on a calendar or on your MDI canister.  ? Keep in mind that you cannot tell when the medicine in an inhaler is empty by shaking it. You may feel or hear something in the canister even when the preset medicine doses have been used up. Keeping track of your dosages is important.  · Do not use any products that contain nicotine or tobacco, such as cigarettes, e-cigarettes, and chewing tobacco. If  you need help quitting, ask your health care provider.  · Keep all follow-up visits as told by your health care provider. This is important.  Where to find more information  · Centers for Disease Control and Prevention: www.cdc.gov  · American Lung Association: www.lung.org  Contact a health care provider if:  · Symptoms are only partially relieved with your inhaler.  · You are having trouble using your inhaler.  · You have side effects from the medicine.  · You have chills or a fever.  · You have night sweats.  · There is blood in your thick saliva (phlegm).  Get help right away if:  · You have dizziness.  · You have a fast heart rate.  · You have severe shortness of breath.  · You have difficulty breathing.  These symptoms may represent a serious problem that is an emergency. Do not wait to see if the symptoms will go away. Get medical help right away. Call your local emergency services (911 in the [place] Do not drive yourself to the hospital.  Summary  · A metered dose inhaler is a handheld device for taking medicine that must be breathed into the lungs (inhaled).  · Take your inhaled medicine only as told by your health care provider. Do not use the inhaler more than directed by your health care provider.  · You cannot tell when the medicine is gone in an inhaler by shaking it. Refill it with medicine before all the preset doses have been used.  · Follow directions on the package insert for care and cleaning of your MDI and spacer.  This information is not intended to replace advice given to you by your health care provider. Make sure you discuss any questions you have with your health care provider.  Document Revised: 02/02/2021 Document Reviewed: 02/02/2021  Elsevier Patient Education © 2021 [employer]

## 2022-01-21 ENCOUNTER — TELEPHONE (OUTPATIENT)
Dept: INTERNAL MEDICINE | Facility: CLINIC | Age: 62
End: 2022-01-21

## 2022-01-21 NOTE — TELEPHONE ENCOUNTER
Patient finished antibiotics Wednesday and is doing so much better. She has no questions or concerns and appreciates the call asking how she is doing.

## 2022-01-28 ENCOUNTER — TREATMENT (OUTPATIENT)
Dept: PHYSICAL THERAPY | Facility: CLINIC | Age: 62
End: 2022-01-28

## 2022-01-28 DIAGNOSIS — M54.42 ACUTE BILATERAL LOW BACK PAIN WITH BILATERAL SCIATICA: ICD-10-CM

## 2022-01-28 DIAGNOSIS — M54.32 SCIATIC NERVE PAIN, LEFT: ICD-10-CM

## 2022-01-28 DIAGNOSIS — M54.41 ACUTE BILATERAL LOW BACK PAIN WITH BILATERAL SCIATICA: ICD-10-CM

## 2022-01-28 DIAGNOSIS — M54.31 SCIATIC NERVE PAIN, RIGHT: Primary | ICD-10-CM

## 2022-01-28 DIAGNOSIS — R29.898 WEAKNESS OF BOTH HIPS: ICD-10-CM

## 2022-01-28 PROCEDURE — 97110 THERAPEUTIC EXERCISES: CPT | Performed by: PHYSICAL THERAPIST

## 2022-01-28 PROCEDURE — 97161 PT EVAL LOW COMPLEX 20 MIN: CPT | Performed by: PHYSICAL THERAPIST

## 2022-01-28 NOTE — PROGRESS NOTES
Physical Therapy Initial Evaluation and Plan of Care    Patient: Susana Kim   : 1960  Diagnosis/ICD-10 Code:  Sciatic nerve pain, right [M54.31]  Referring practitioner: Pooja Mayberry*  Date of Initial Visit: 2022  Today's Date: 2022  Patient seen for 1 sessions           Subjective Questionnaire: LEFS: 48/80 or 40-59% limited      Subjective Evaluation    History of Present Illness  Mechanism of injury: Pt reports her pain started in May or .  Pt reports pain under R buttocks that runs down into hamstring.  Pt previously went to a chiropractor and he stretched her leg over her head and it was too much movement.  Pt states she is very active and walks a lot.  Pt reports pain is worse with walking and bending over.  Pt reports a history of low back pain as well.  Pt reports occasional tingling of B feet.  Pt states occasional pain into her R calf.  Pt reports she gets the same pain on the L leg but it is not as bad as the R.      Medical history: HTN    Pain  Current pain ratin  At best pain ratin  At worst pain ratin  Quality: dull ache  Relieving factors: rest  Aggravating factors: movement, repetitive movement, ambulation, stairs and squatting    Patient Goals  Patient goals for therapy: decreased pain             Objective          Palpation   Left   No palpable tenderness to the piriformis.   Tenderness of the lumbar paraspinals.     Right   No palpable tenderness to the piriformis. Tenderness of the lumbar paraspinals.     Tenderness     Lumbar Spine  Tenderness in the spinous process.     Neurological Testing     Sensation     Lumbar   Left   Intact: light touch    Right   Intact: light touch    Additional Neurological Details  B sciatic nerve tension (R worse than L)    Active Range of Motion     Additional Active Range of Motion Details  Lumbar spine ROM WNL; increased pain in B posterior leg with flexion    Passive Range of Motion     Additional  Passive Range of Motion Details  Hypomobility in lumbar spine with PA mobilizations    Strength/Myotome Testing     Left Hip   Planes of Motion   Flexion: 4+  Extension: 4-  Abduction: 4  Adduction: 4-    Right Hip   Planes of Motion   Flexion: 4+  Extension: 3+  Abduction: 4  Adduction: 4-    Left Knee   Flexion: 5  Extension: 5    Right Knee   Flexion: 4  Extension: 5    Left Ankle/Foot   Dorsiflexion: 5  Plantar flexion: 5  Inversion: 5  Eversion: 5    Right Ankle/Foot   Dorsiflexion: 5  Plantar flexion: 5  Inversion: 5  Eversion: 5    Tests       Thoracic   Positive slump.     Left Hip   Positive DAVID.     Right Hip   Positive DAVID.     Lumbar Flexibility Comments:   Tightness through B piriformis    Ambulation     Observational Gait   Gait: within functional limits   Walking speed and stride length within functional limits.         See Exercise, Manual, and Modality Logs for complete treatment.       Assessment & Plan     Assessment  Impairments: activity intolerance, impaired physical strength, lacks appropriate home exercise program and pain with function  Functional Limitations: lifting, walking, uncomfortable because of pain, standing, stooping and unable to perform repetitive tasks  Assessment details: Pt presents with limitations, noted below, that impede her ability to bend over, walk, and perform functional activities.  The patient presents with a diagnosis of R leg pain and has B sciatic nerve tension, LBP, and B hip and core weakness and will benefit from therapeutic exercises, manual therapy, and modalities to improve tolerance to functional activities. The skills of a therapist will be required to safely and effectively implement the following treatment plan to restore maximal level of function.  Prognosis: good    Goals  Plan Goals: 1. The patient complains of B leg pain.   LTG 1: 12 weeks:  The patient will report a pain rating of 2/10 or better in order to improve tolerance to activities of  daily living and improve sleep quality.   STATUS:  New   STG 1a: 6 weeks:  The patient will report a pain rating of 3/10 or better.   STATUS:  New      2. The patient demonstrates weakness of the B hips.   LTG 2: 12 weeks:  The patient will demonstrate 4+/5 strength for B hip flexion, abduction, and extension in order to improve hip stability.   STATUS:  New   STG 2a: 6 weeks:  The patient will demonstrate 4/5 strength for B hip flexion, abduction, and extension.   STATUS:  New      3. Mobility: Walking/Moving Around Functional Limitation     LTG 3: 12 weeks:  The patient will demonstrate 1-19% limitation by achieving a score of 70 on the Lower Extremity Functional Scale.   STATUS:  New   STG 3a: 6 weeks:  The patient will demonstrate 20-39% limitation by achieving a score of 60 on the Lower Extremity Functional Scale.     STATUS:  New     TREATMENT:  Therapeutic exercises, manual therapy, aquatic therapy, home exercise instruction, and modalities as needed for pain to include:  electrical stimulation, moist heat, ice, and  ultrasound    Plan  Therapy options: will be seen for skilled therapy services  Planned modality interventions: cryotherapy, dry needling, electrical stimulation/Russian stimulation, hydrotherapy, traction, ultrasound and TENS  Planned therapy interventions: abdominal trunk stabilization, body mechanics training, flexibility, functional ROM exercises, gait training, home exercise program, joint mobilization, manual therapy, neuromuscular re-education, postural training, soft tissue mobilization, spinal/joint mobilization, strengthening, stretching and therapeutic activities  Frequency: 3x week  Duration in weeks: 12  Treatment plan discussed with: patient        History # of Personal Factors and/or Comorbidities: LOW (0)  Examination of Body System(s): # of elements: LOW (1-2)  Clinical Presentation: STABLE   Clinical Decision Making: LOW       Timed:         Manual Therapy:         mins   92075;     Therapeutic Exercise:    12     mins  07395;     Neuromuscular Yohannes:        mins  23677;    Therapeutic Activity:          mins  65228;     Gait Training:           mins  10533;     Ultrasound:          mins  98094;    Ionto                                   mins   25041  Self Care                            mins   13224  Canalith Repos         mins 30526      Un-Timed:  Electrical Stimulation:         mins  54954 ( );  Dry Needling          mins self-pay  Traction          mins 86841  Low Eval     25     Mins  90318  Mod Eval          Mins  08494  High Eval                            Mins  23514  Re-Eval                               mins  14683        Timed Treatment:   12   mins   Total Treatment:     37    mins    PT SIGNATURE: Electronically signed by JOSEPHINE Pena License: [id number]      Initial Certification  Certification Period: 1/28/2022 thru 4/27/2022  I certify that the therapy services are furnished while this patient is under my care.  The services outlined above are required by this patient, and will be reviewed every 90 days.     PHYSICIAN: Pooja Nickerson MD  NPI: [id number]                                      DATE:        Please sign and return via fax to [phone].Thank you, [facility].

## 2022-02-18 ENCOUNTER — HOSPITAL ENCOUNTER (OUTPATIENT)
Dept: MAMMOGRAPHY | Facility: HOSPITAL | Age: 62
Discharge: HOME | End: 2022-02-18
Admitting: INTERNAL MEDICINE

## 2022-02-18 DIAGNOSIS — Z12.31 VISIT FOR SCREENING MAMMOGRAM: ICD-10-CM

## 2022-02-18 PROCEDURE — 77063 BREAST TOMOSYNTHESIS BI: CPT

## 2022-02-18 PROCEDURE — 77067 SCR MAMMO BI INCL CAD: CPT

## 2022-02-22 ENCOUNTER — OFFICE VISIT (OUTPATIENT)
Dept: CARDIOLOGY | Facility: CLINIC | Age: 62
End: 2022-02-22

## 2022-02-22 VITALS
BODY MASS INDEX: 25.97 KG/M2 | HEART RATE: 58 BPM | DIASTOLIC BLOOD PRESSURE: 59 MMHG | SYSTOLIC BLOOD PRESSURE: 137 MMHG | WEIGHT: 165.8 LBS

## 2022-02-22 DIAGNOSIS — I48.0 PAF (PAROXYSMAL ATRIAL FIBRILLATION): Primary | ICD-10-CM

## 2022-02-22 DIAGNOSIS — I10 ESSENTIAL HYPERTENSION: ICD-10-CM

## 2022-02-22 PROCEDURE — 99214 OFFICE O/P EST MOD 30 MIN: CPT | Performed by: INTERNAL MEDICINE

## 2022-02-22 NOTE — ASSESSMENT & PLAN NOTE
Patient is doing well symptomatically no recurrent episodes on diltiazem 180 daily.  Discussed with patient importance of exercise it prevention.  She can continue with Eliquis 5 twice daily for CVA prevention

## 2022-02-22 NOTE — PROGRESS NOTES
Chief Complaint  Paroxysmal atrial fibrillation    Subjective    Patient with no recurrent tachycardic problems or issues with shortness of breath    Past Medical History:   Diagnosis Date   • Atrial fibrillation (HCC)    • Bunion    • Essential hypertension 8/16/2021   • Hypertension    • PAF (paroxysmal atrial fibrillation) ([facility]) 8/16/2021         Current Outpatient Medications:   •  Calcium 500-125 MG-UNIT tablet, Calcium 500 + D (D3) 500 mg(1,250mg) -125 unit oral tablet take 1 tablet by oral route daily   Suspended, Disp: , Rfl:   •  dilTIAZem CD (CARDIZEM CD) 180 MG 24 hr capsule, Take 1 capsule by mouth once daily, Disp: 90 capsule, Rfl: 2  •  Eliquis 5 MG tablet tablet, Take 1 tablet by mouth twice daily, Disp: 180 tablet, Rfl: 2  •  albuterol sulfate  (90 Base) MCG/ACT inhaler, Inhale 2 puffs Every 4 (Four) Hours As Needed for Wheezing or Shortness of Air (cough)., Disp: 18 g, Rfl: 0    There are no discontinued medications.  No Known Allergies     Social History     Tobacco Use   • Smoking status: Never Smoker   • Smokeless tobacco: Never Used   Vaping Use   • Vaping Use: Never used   Substance Use Topics   • Alcohol use: Not Currently   • Drug use: Never       Family History   Family history unknown: Yes        Objective     /59   Pulse 58   Wt 75.2 kg (165 lb 12.8 oz)   BMI 25.97 kg/m²       Physical Exam    General Appearance:   · no acute distress  · Alert and oriented x3  HENT:   · lips not cyanotic  · Atraumatic  Neck:  · No jvd   · supple  Respiratory:  · no respiratory distress  · normal breath sounds  · no rales  Cardiovascular:  · Regular rate and rhythm  · no S3, no S4   · no murmur  · no rub  Extremities  · No cyanosis  · lower extremity edema: none    Skin:   · warm, dry  · No rashes      Result Review :     No results found for: PROBNP  CMP    CMP 12/6/21   Glucose 99   BUN 16   Creatinine 0.64   eGFR Non African Am 94   Sodium 142   Potassium 4.0   Chloride 101   Calcium 9.6    Albumin 4.90   Total Bilirubin 0.3   Alkaline Phosphatase 96   AST (SGOT) 23   ALT (SGPT) 19           CBC w/diff    CBC w/Diff 12/6/21   WBC 7.78   RBC 4.41   Hemoglobin 14.0   Hematocrit 42.5   MCV 96.4   MCH 31.7   MCHC 32.9   RDW 12.0 (A)   Platelets 281   Neutrophil Rel % 65.2   Immature Granulocyte Rel % 0.3   Lymphocyte Rel % 23.5   Monocyte Rel % 8.5   Eosinophil Rel % 1.7   Basophil Rel % 0.8   (A) Abnormal value             Lab Results   Component Value Date    TSH 1.490 12/06/2021      No results found for: FREET4   No results found for: DDIMERQUANT  No results found for: MG   No results found for: DIGOXIN   No results found for: TROPONINT        Lipid Panel    Lipid Panel 12/6/21   Total Cholesterol 234 (A)   Triglycerides 68   HDL Cholesterol 58   VLDL Cholesterol 12   LDL Cholesterol  164 (A)   LDL/HDL Ratio 2.80   (A) Abnormal value            No results found for: POCTROP                   Diagnoses and all orders for this visit:    1. PAF (paroxysmal atrial fibrillation) (HCC) (Primary)  Assessment & Plan:  Patient is doing well symptomatically no recurrent episodes on diltiazem 180 daily.  Discussed with patient importance of exercise it prevention.  She can continue with Eliquis 5 twice daily for CVA prevention      2. Essential hypertension  Assessment & Plan:  Blood pressure controlled in office today but borderline elevated on diltiazem 180 daily counseled on low-sodium diet less than 2 g/day            Follow Up     Return in about 6 months (around 8/22/2022) for EKG with F/U, Follow with Amber Molina.          Patient was given instructions and counseling regarding her condition or for health maintenance advice. Please see specific information pulled into the AVS if appropriate.

## 2022-02-22 NOTE — ASSESSMENT & PLAN NOTE
Blood pressure controlled in office today but borderline elevated on diltiazem 180 daily counseled on low-sodium diet less than 2 g/day

## 2022-02-28 ENCOUNTER — DOCUMENTATION (OUTPATIENT)
Dept: PHYSICAL THERAPY | Facility: CLINIC | Age: 62
End: 2022-02-28

## 2022-02-28 NOTE — PROGRESS NOTES
Pt attended her initial evaluation and is now requesting discharge from PT as her insurance did not cover some services.  Goals have not been met.      Strength/Myotome Testing      Left Hip   Planes of Motion   Flexion: 4+  Extension: 4-  Abduction: 4  Adduction: 4-     Right Hip   Planes of Motion   Flexion: 4+  Extension: 3+  Abduction: 4  Adduction: 4-     Left Knee   Flexion: 5  Extension: 5     Right Knee   Flexion: 4  Extension: 5     Left Ankle/Foot   Dorsiflexion: 5  Plantar flexion: 5  Inversion: 5  Eversion: 5     Right Ankle/Foot   Dorsiflexion: 5  Plantar flexion: 5  Inversion: 5  Eversion: 5    Goals: 1. The patient complains of B leg pain.   LTG 1: 12 weeks:  The patient will report a pain rating of 2/10 or better in order to improve tolerance to activities of daily living and improve sleep quality.   STATUS:  Not met   STG 1a: 6 weeks:  The patient will report a pain rating of 3/10 or better.   STATUS:  Not met     2. The patient demonstrates weakness of the B hips.   LTG 2: 12 weeks:  The patient will demonstrate 4+/5 strength for B hip flexion, abduction, and extension in order to improve hip stability.   STATUS:  Not met  STG 2a: 6 weeks:  The patient will demonstrate 4/5 strength for B hip flexion, abduction, and extension.   STATUS:  Not met     3. Mobility: Walking/Moving Around Functional Limitation                   LTG 3: 12 weeks:  The patient will demonstrate 1-19% limitation by achieving a score of 70 on the Lower Extremity Functional Scale.   STATUS:  Not met  STG 3a: 6 weeks:  The patient will demonstrate 20-39% limitation by achieving a score of 60 on the Lower Extremity Functional Scale.     STATUS:  Not met

## 2022-08-22 ENCOUNTER — OFFICE VISIT (OUTPATIENT)
Dept: CARDIOLOGY | Facility: CLINIC | Age: 62
End: 2022-08-22

## 2022-08-22 VITALS
BODY MASS INDEX: 25.48 KG/M2 | WEIGHT: 162.38 LBS | DIASTOLIC BLOOD PRESSURE: 63 MMHG | SYSTOLIC BLOOD PRESSURE: 140 MMHG | HEART RATE: 60 BPM | HEIGHT: 67 IN

## 2022-08-22 DIAGNOSIS — I48.0 PAF (PAROXYSMAL ATRIAL FIBRILLATION): Primary | ICD-10-CM

## 2022-08-22 DIAGNOSIS — I20.8 CHRONIC STABLE ANGINA: ICD-10-CM

## 2022-08-22 DIAGNOSIS — I10 ESSENTIAL HYPERTENSION: ICD-10-CM

## 2022-08-22 PROBLEM — M21.619 BUNION: Status: ACTIVE | Noted: 2022-08-22

## 2022-08-22 PROCEDURE — 99214 OFFICE O/P EST MOD 30 MIN: CPT | Performed by: NURSE PRACTITIONER

## 2022-08-22 PROCEDURE — 93000 ELECTROCARDIOGRAM COMPLETE: CPT | Performed by: NURSE PRACTITIONER

## 2022-08-22 RX ORDER — DILTIAZEM HYDROCHLORIDE 180 MG/1
180 CAPSULE, COATED, EXTENDED RELEASE ORAL DAILY
Qty: 90 CAPSULE | Refills: 2 | Status: SHIPPED | OUTPATIENT
Start: 2022-08-22 | End: 2023-03-06 | Stop reason: SDUPTHER

## 2022-08-22 RX ORDER — HYDROCHLOROTHIAZIDE 12.5 MG/1
12.5 CAPSULE, GELATIN COATED ORAL DAILY
Qty: 30 CAPSULE | Refills: 3 | Status: SHIPPED | OUTPATIENT
Start: 2022-08-22 | End: 2022-12-13 | Stop reason: SDUPTHER

## 2022-08-22 NOTE — PROGRESS NOTES
Chief Complaint  Atrial Fibrillation and Hypertension    Subjective            History of Present Illness  Susana Kim is a 61-year-old white/ female patient who presents to the office today for follow-up.  She reports that she has been experiencing some chest discomfort at nighttime and brief episodes for the past couple of months.  She admits that it is nonexertional in nature and radiates from right side of chest to left side of chest.  She denies any associated shortness of breath.  She has also been noticing elevated systolic pressure readings.  She does not monitor her sodium intake.  She has also noticed a 10 pound weight gain in the last 6 months even though she walks purposefully approximately 20 to 30 minutes 4 to 6 days a week for exercise.  She denies any symptoms while she is exercising.  She has paroxysmal atrial fibrillation and hypertension.  She reports compliance with all of her medications.    PMH  Past Medical History:   Diagnosis Date   • Atrial fibrillation (HCC)    • Bunion    • Essential hypertension 2021   • Hypertension    • PAF (paroxysmal atrial fibrillation) (HCC) 2021         ALLERGY  No Known Allergies       SURGICALHX  Past Surgical History:   Procedure Laterality Date   •  SECTION     • COLONOSCOPY  ,   • ENDOSCOPY            SOC  Social History     Socioeconomic History   • Marital status:    Tobacco Use   • Smoking status: Never Smoker   • Smokeless tobacco: Never Used   Vaping Use   • Vaping Use: Never used   Substance and Sexual Activity   • Alcohol use: Not Currently   • Drug use: Never         FAMHX  Family History   Family history unknown: Yes          MEDSIGONLY  Current Outpatient Medications on File Prior to Visit   Medication Sig   • Calcium 500-125 MG-UNIT tablet Calcium 500 + D (D3) 500 mg(1,250mg) -125 unit oral tablet take 1 tablet by oral route daily   Suspended     No current facility-administered medications on  "file prior to visit.       Objective   /63   Pulse 60   Ht 170.2 cm (67\")   Wt 73.7 kg (162 lb 6 oz)   BMI 25.43 kg/m²       Physical Exam  HENT:      Head: Normocephalic.   Neck:      Vascular: No carotid bruit.   Cardiovascular:      Rate and Rhythm: Normal rate and regular rhythm.      Pulses: Normal pulses.      Heart sounds: Normal heart sounds. No murmur heard.  Pulmonary:      Effort: Pulmonary effort is normal.      Breath sounds: Normal breath sounds.   Musculoskeletal:      Cervical back: Neck supple.      Right lower leg: No edema.      Left lower leg: No edema.   Skin:     General: Skin is dry.      Capillary Refill: Capillary refill takes less than 2 seconds.   Neurological:      Mental Status: She is alert and oriented to person, place, and time.   Psychiatric:         Behavior: Behavior normal.       ECG 12 Lead    Date/Time: 8/22/2022 1:10 PM  Performed by: Amber Molina APRN  Authorized by: Amber Molina APRN   Comparison: compared with previous ECG   Rhythm: sinus rhythm  Rate: bradycardic  BPM: 56  Conduction: conduction normal  ST Segments: ST segments normal  T Waves: T waves normal  QRS axis: normal  Other: no other findings    Clinical impression: normal ECG          Result Review :   The following data was reviewed by: LYDIA Rendon on 08/22/2022:  No results found for: PROBNP  CMP    CMP 12/6/21   Glucose 99   BUN 16   Creatinine 0.64   eGFR Non African Am 94   Sodium 142   Potassium 4.0   Chloride 101   Calcium 9.6   Albumin 4.90   Total Bilirubin 0.3   Alkaline Phosphatase 96   AST (SGOT) 23   ALT (SGPT) 19           CBC w/diff    CBC w/Diff 12/6/21   WBC 7.78   RBC 4.41   Hemoglobin 14.0   Hematocrit 42.5   MCV 96.4   MCH 31.7   MCHC 32.9   RDW 12.0 (A)   Platelets 281   Neutrophil Rel % 65.2   Immature Granulocyte Rel % 0.3   Lymphocyte Rel % 23.5   Monocyte Rel % 8.5   Eosinophil Rel % 1.7   Basophil Rel % 0.8   (A) Abnormal value             Lab " Results   Component Value Date    TSH 1.490 12/06/2021      No results found for: FREET4   No results found for: DDIMERQUANT  No results found for: MG   No results found for: DIGOXIN   No results found for: TROPONINT        Lipid Panel    Lipid Panel 12/6/21   Total Cholesterol 234 (A)   Triglycerides 68   HDL Cholesterol 58   VLDL Cholesterol 12   LDL Cholesterol  164 (A)   LDL/HDL Ratio 2.80   (A) Abnormal value               Assessment and Plan    Diagnoses and all orders for this visit:    1. PAF (paroxysmal atrial fibrillation)  (Primary)  Symptomatically stable at this time, continue diltiazem 180 mg daily.  Continue Eliquis for CVA prevention.    2. Essential hypertension  Slightly elevated in office today and she has noticed elevation at home.  Start hydrochlorothiazide 12.5 mg daily.  Check BMP in 2 weeks to assess renal function.  -     Basic Metabolic Panel; Future    3. Chronic stable angina   Since her chest pain is recurrent in nature, obtain treadmill stress test to rule out ischemia.  -     Treadmill Stress Test; Future    Other orders  -     dilTIAZem CD (CARDIZEM CD) 180 MG 24 hr capsule; Take 1 capsule by mouth Daily.  Dispense: 90 capsule; Refill: 2  -     apixaban (Eliquis) 5 MG tablet tablet; Take 1 tablet by mouth 2 (Two) Times a Day.  Dispense: 180 tablet; Refill: 2  -     hydroCHLOROthiazide (MICROZIDE) 12.5 MG capsule; Take 1 capsule by mouth Daily.  Dispense: 30 capsule; Refill: 3  -     ECG 12 Lead            Follow Up   Return in about 6 months (around 2/22/2023) for Follow up with Dr Potter.    Patient was given instructions and counseling regarding her condition or for health maintenance advice. Please see specific information pulled into the AVS if appropriate.     Susana Kim  reports that she has never smoked. She has never used smokeless tobacco.           Amber Molina, APRN  08/24/22  10:26 EDT    Dictated Utilizing Dragon Dictation

## 2022-09-07 ENCOUNTER — TELEPHONE (OUTPATIENT)
Dept: CARDIOLOGY | Facility: CLINIC | Age: 62
End: 2022-09-07

## 2022-09-07 ENCOUNTER — LAB (OUTPATIENT)
Dept: LAB | Facility: HOSPITAL | Age: 62
End: 2022-09-07

## 2022-09-07 DIAGNOSIS — I10 ESSENTIAL HYPERTENSION: ICD-10-CM

## 2022-09-07 PROCEDURE — 36415 COLL VENOUS BLD VENIPUNCTURE: CPT

## 2022-09-07 PROCEDURE — 80048 BASIC METABOLIC PNL TOTAL CA: CPT

## 2022-09-08 ENCOUNTER — TELEPHONE (OUTPATIENT)
Dept: CARDIOLOGY | Facility: CLINIC | Age: 62
End: 2022-09-08

## 2022-09-08 LAB
ANION GAP SERPL CALCULATED.3IONS-SCNC: 10.9 MMOL/L (ref 5–15)
BUN SERPL-MCNC: 16 MG/DL (ref 8–23)
BUN/CREAT SERPL: 20 (ref 7–25)
CALCIUM SPEC-SCNC: 9.8 MG/DL (ref 8.6–10.5)
CHLORIDE SERPL-SCNC: 103 MMOL/L (ref 98–107)
CO2 SERPL-SCNC: 29.1 MMOL/L (ref 22–29)
CREAT SERPL-MCNC: 0.8 MG/DL (ref 0.57–1)
EGFRCR SERPLBLD CKD-EPI 2021: 82.4 ML/MIN/1.73
GLUCOSE SERPL-MCNC: 89 MG/DL (ref 65–99)
POTASSIUM SERPL-SCNC: 4.1 MMOL/L (ref 3.5–5.2)
SODIUM SERPL-SCNC: 143 MMOL/L (ref 136–145)

## 2022-09-08 NOTE — TELEPHONE ENCOUNTER
----- Message from LYDIA Mcgill sent at 9/8/2022  6:54 AM EDT -----  Notify pt labs are good, continue current meds

## 2022-09-13 ENCOUNTER — TELEPHONE (OUTPATIENT)
Dept: CARDIOLOGY | Facility: CLINIC | Age: 62
End: 2022-09-13

## 2022-09-13 DIAGNOSIS — I20.8 CHRONIC STABLE ANGINA: Primary | ICD-10-CM

## 2022-09-13 DIAGNOSIS — R94.39 ABNORMAL STRESS TEST: ICD-10-CM

## 2022-09-13 NOTE — TELEPHONE ENCOUNTER
----- Message from LYDIA Mcgill sent at 9/13/2022  3:28 PM EDT -----  Notify pt stress test result:   1.  Patient with good exercise capacity but only able to achieve 73% of target heart rate   2.  No clinical symptoms of angina  3.  Mild 1 mm ST segment depressions borderline for ischemia  4.  Low risk stress test but given the mild EKG changes as well as not been able to achieve full target heart rate would recommend repeat.  Stress test with Lexiscan nuclear stress imaging  Let me know if patient is agreeable so I can place order, thanks

## 2022-09-13 NOTE — TELEPHONE ENCOUNTER
SW patient regarding results and recommendations. Patient agreeable to proceed with lexiscan. Please place order.

## 2022-10-20 ENCOUNTER — HOSPITAL ENCOUNTER (OUTPATIENT)
Dept: NUCLEAR MEDICINE | Facility: HOSPITAL | Age: 62
Discharge: HOME | End: 2022-10-20

## 2022-10-20 DIAGNOSIS — R94.39 ABNORMAL STRESS TEST: ICD-10-CM

## 2022-10-20 DIAGNOSIS — I20.8 CHRONIC STABLE ANGINA: ICD-10-CM

## 2022-10-20 LAB
BH CV IMMEDIATE POST RECOVERY TECH DATA SYMPTOMS: NORMAL
BH CV IMMEDIATE POST TECH DATA BLOOD PRESSURE: NORMAL MMHG
BH CV IMMEDIATE POST TECH DATA HEART RATE: 70 BPM
BH CV IMMEDIATE POST TECH DATA OXYGEN SATS: 99 %
BH CV NINE MINUTE RECOVERY TECH DATA BLOOD PRESSURE: NORMAL MMHG
BH CV NINE MINUTE RECOVERY TECH DATA HEART RATE: 63 BPM
BH CV NINE MINUTE RECOVERY TECH DATA OXYGEN SATURATION: 98 %
BH CV NINE MINUTE RECOVERY TECH DATA SYMPTOMS: NORMAL
BH CV REST NUCLEAR ISOTOPE DOSE: 9.3 MCI
BH CV SIX MINUTE RECOVERY TECH DATA BLOOD PRESSURE: NORMAL
BH CV SIX MINUTE RECOVERY TECH DATA HEART RATE: 61 BPM
BH CV SIX MINUTE RECOVERY TECH DATA OXYGEN SATURATION: 98 %
BH CV SIX MINUTE RECOVERY TECH DATA SYMPTOMS: NORMAL
BH CV STRESS BP STAGE 1: NORMAL
BH CV STRESS BP STAGE 2: NORMAL
BH CV STRESS COMMENTS STAGE 1: NORMAL
BH CV STRESS COMMENTS STAGE 2: NORMAL
BH CV STRESS DOSE REGADENOSON STAGE 1: 0.4
BH CV STRESS DURATION MIN STAGE 1: 2
BH CV STRESS DURATION MIN STAGE 2: 2
BH CV STRESS DURATION SEC STAGE 1: 0
BH CV STRESS DURATION SEC STAGE 2: 0
BH CV STRESS HR STAGE 1: 72
BH CV STRESS HR STAGE 2: 90
BH CV STRESS NUCLEAR ISOTOPE DOSE: 33.2 MCI
BH CV STRESS O2 STAGE 1: 98
BH CV STRESS O2 STAGE 2: 99
BH CV STRESS PROTOCOL 1: NORMAL
BH CV STRESS RECOVERY BP: NORMAL MMHG
BH CV STRESS RECOVERY HR: 59 BPM
BH CV STRESS RECOVERY O2: 98 %
BH CV STRESS STAGE 1: 1
BH CV STRESS STAGE 2: 2
BH CV THREE MINUTE POST TECH DATA BLOOD PRESSURE: NORMAL MMHG
BH CV THREE MINUTE POST TECH DATA HEART RATE: 59 BPM
BH CV THREE MINUTE POST TECH DATA OXYGEN SATURATION: 99 %
BH CV THREE MINUTE RECOVERY TECH DATA SYMPTOM: NORMAL
BH CV TWELVE MINUTE RECOVERY TECH DATA BLOOD PRESSURE: NORMAL MMHG
BH CV TWELVE MINUTE RECOVERY TECH DATA HEART RATE: 59 BPM
BH CV TWELVE MINUTE RECOVERY TECH DATA SYMPTOMS: NORMAL
LV EF NUC BP: 75 %
MAXIMAL PREDICTED HEART RATE: 156 BPM
PERCENT MAX PREDICTED HR: 57.69 %
STRESS BASELINE BP: NORMAL MMHG
STRESS BASELINE HR: 56 BPM
STRESS O2 SAT REST: 97 %
STRESS PERCENT HR: 68 %
STRESS POST ESTIMATED WORKLOAD: 2.3 METS
STRESS POST EXERCISE DUR MIN: 4 MIN
STRESS POST EXERCISE DUR SEC: 0 SEC
STRESS POST O2 SAT PEAK: 99 %
STRESS POST PEAK BP: NORMAL MMHG
STRESS POST PEAK HR: 90 BPM
STRESS TARGET HR: 133 BPM

## 2022-10-20 PROCEDURE — 0 TECHNETIUM TETROFOSMIN KIT: Performed by: NURSE PRACTITIONER

## 2022-10-20 PROCEDURE — 93017 CV STRESS TEST TRACING ONLY: CPT

## 2022-10-20 PROCEDURE — A9502 TC99M TETROFOSMIN: HCPCS | Performed by: NURSE PRACTITIONER

## 2022-10-20 PROCEDURE — 93018 CV STRESS TEST I&R ONLY: CPT | Performed by: INTERNAL MEDICINE

## 2022-10-20 PROCEDURE — 78452 HT MUSCLE IMAGE SPECT MULT: CPT

## 2022-10-20 PROCEDURE — 25010000002 REGADENOSON 0.4 MG/5ML SOLUTION

## 2022-10-20 PROCEDURE — 78452 HT MUSCLE IMAGE SPECT MULT: CPT | Performed by: INTERNAL MEDICINE

## 2022-10-20 RX ADMIN — REGADENOSON: 0.08 INJECTION, SOLUTION INTRAVENOUS at 11:32

## 2022-10-20 RX ADMIN — TETROFOSMIN 1 DOSE: 1.38 INJECTION, POWDER, LYOPHILIZED, FOR SOLUTION INTRAVENOUS at 11:32

## 2022-10-20 RX ADMIN — TETROFOSMIN 1 DOSE: 1.38 INJECTION, POWDER, LYOPHILIZED, FOR SOLUTION INTRAVENOUS at 10:12

## 2022-10-21 ENCOUNTER — TELEPHONE (OUTPATIENT)
Dept: CARDIOLOGY | Facility: CLINIC | Age: 62
End: 2022-10-21

## 2022-10-21 NOTE — TELEPHONE ENCOUNTER
----- Message from LYDIA Escamilla sent at 10/21/2022  9:18 AM EDT -----  Notify patient the results of her ultrasound of her heart were completely normal.  Her heart is functioning well and there is no signs of structural or valvular abnormalities.

## 2022-11-29 RX ORDER — HYDROCHLOROTHIAZIDE 12.5 MG/1
CAPSULE, GELATIN COATED ORAL
Qty: 30 CAPSULE | Refills: 0 | OUTPATIENT
Start: 2022-11-29

## 2022-12-07 ENCOUNTER — OFFICE VISIT (OUTPATIENT)
Dept: INTERNAL MEDICINE | Facility: CLINIC | Age: 62
End: 2022-12-07

## 2022-12-07 VITALS
HEIGHT: 67 IN | HEART RATE: 51 BPM | OXYGEN SATURATION: 99 % | TEMPERATURE: 98.3 F | SYSTOLIC BLOOD PRESSURE: 112 MMHG | DIASTOLIC BLOOD PRESSURE: 68 MMHG | WEIGHT: 163.6 LBS | BODY MASS INDEX: 25.68 KG/M2

## 2022-12-07 DIAGNOSIS — E55.9 VITAMIN D DEFICIENCY: ICD-10-CM

## 2022-12-07 DIAGNOSIS — F51.01 PRIMARY INSOMNIA: ICD-10-CM

## 2022-12-07 DIAGNOSIS — I48.0 PAF (PAROXYSMAL ATRIAL FIBRILLATION): ICD-10-CM

## 2022-12-07 DIAGNOSIS — Z11.59 NEED FOR HEPATITIS C SCREENING TEST: ICD-10-CM

## 2022-12-07 DIAGNOSIS — R25.2 MUSCLE CRAMP: ICD-10-CM

## 2022-12-07 DIAGNOSIS — I10 ESSENTIAL HYPERTENSION: ICD-10-CM

## 2022-12-07 DIAGNOSIS — Z00.00 ANNUAL VISIT FOR GENERAL ADULT MEDICAL EXAMINATION WITHOUT ABNORMAL FINDINGS: Primary | ICD-10-CM

## 2022-12-07 LAB
25(OH)D3 SERPL-MCNC: 43.3 NG/ML (ref 30–100)
ALBUMIN SERPL-MCNC: 4.4 G/DL (ref 3.5–5.2)
ALBUMIN/GLOB SERPL: 1.8 G/DL
ALP SERPL-CCNC: 98 U/L (ref 39–117)
ALT SERPL W P-5'-P-CCNC: 13 U/L (ref 1–33)
ANION GAP SERPL CALCULATED.3IONS-SCNC: 8.8 MMOL/L (ref 5–15)
AST SERPL-CCNC: 14 U/L (ref 1–32)
BASOPHILS # BLD AUTO: 0.06 10*3/MM3 (ref 0–0.2)
BASOPHILS NFR BLD AUTO: 1.1 % (ref 0–1.5)
BILIRUB SERPL-MCNC: 0.5 MG/DL (ref 0–1.2)
BUN SERPL-MCNC: 13 MG/DL (ref 8–23)
BUN/CREAT SERPL: 16.5 (ref 7–25)
CALCIUM SPEC-SCNC: 9.5 MG/DL (ref 8.6–10.5)
CHLORIDE SERPL-SCNC: 104 MMOL/L (ref 98–107)
CHOLEST SERPL-MCNC: 229 MG/DL (ref 0–200)
CO2 SERPL-SCNC: 29.2 MMOL/L (ref 22–29)
CREAT SERPL-MCNC: 0.79 MG/DL (ref 0.57–1)
DEPRECATED RDW RBC AUTO: 41.8 FL (ref 37–54)
EGFRCR SERPLBLD CKD-EPI 2021: 83.6 ML/MIN/1.73
EOSINOPHIL # BLD AUTO: 0.08 10*3/MM3 (ref 0–0.4)
EOSINOPHIL NFR BLD AUTO: 1.5 % (ref 0.3–6.2)
ERYTHROCYTE [DISTWIDTH] IN BLOOD BY AUTOMATED COUNT: 12 % (ref 12.3–15.4)
GLOBULIN UR ELPH-MCNC: 2.5 GM/DL
GLUCOSE SERPL-MCNC: 93 MG/DL (ref 65–99)
HCT VFR BLD AUTO: 39.4 % (ref 34–46.6)
HCV AB SER DONR QL: NORMAL
HDLC SERPL-MCNC: 62 MG/DL (ref 40–60)
HGB BLD-MCNC: 13.3 G/DL (ref 12–15.9)
IMM GRANULOCYTES # BLD AUTO: 0.01 10*3/MM3 (ref 0–0.05)
IMM GRANULOCYTES NFR BLD AUTO: 0.2 % (ref 0–0.5)
LDLC SERPL CALC-MCNC: 159 MG/DL (ref 0–100)
LDLC/HDLC SERPL: 2.54 {RATIO}
LYMPHOCYTES # BLD AUTO: 1.56 10*3/MM3 (ref 0.7–3.1)
LYMPHOCYTES NFR BLD AUTO: 29.2 % (ref 19.6–45.3)
MAGNESIUM SERPL-MCNC: 2.1 MG/DL (ref 1.6–2.4)
MCH RBC QN AUTO: 31.8 PG (ref 26.6–33)
MCHC RBC AUTO-ENTMCNC: 33.8 G/DL (ref 31.5–35.7)
MCV RBC AUTO: 94.3 FL (ref 79–97)
MONOCYTES # BLD AUTO: 0.54 10*3/MM3 (ref 0.1–0.9)
MONOCYTES NFR BLD AUTO: 10.1 % (ref 5–12)
NEUTROPHILS NFR BLD AUTO: 3.09 10*3/MM3 (ref 1.7–7)
NEUTROPHILS NFR BLD AUTO: 57.9 % (ref 42.7–76)
NRBC BLD AUTO-RTO: 0 /100 WBC (ref 0–0.2)
PLATELET # BLD AUTO: 243 10*3/MM3 (ref 140–450)
PMV BLD AUTO: 11.2 FL (ref 6–12)
POTASSIUM SERPL-SCNC: 3.9 MMOL/L (ref 3.5–5.2)
PROT SERPL-MCNC: 6.9 G/DL (ref 6–8.5)
RBC # BLD AUTO: 4.18 10*6/MM3 (ref 3.77–5.28)
SODIUM SERPL-SCNC: 142 MMOL/L (ref 136–145)
TRIGL SERPL-MCNC: 48 MG/DL (ref 0–150)
TSH SERPL DL<=0.05 MIU/L-ACNC: 1.47 UIU/ML (ref 0.27–4.2)
VLDLC SERPL-MCNC: 8 MG/DL (ref 5–40)
WBC NRBC COR # BLD: 5.34 10*3/MM3 (ref 3.4–10.8)

## 2022-12-07 PROCEDURE — 36415 COLL VENOUS BLD VENIPUNCTURE: CPT | Performed by: INTERNAL MEDICINE

## 2022-12-07 PROCEDURE — 80061 LIPID PANEL: CPT | Performed by: INTERNAL MEDICINE

## 2022-12-07 PROCEDURE — 82306 VITAMIN D 25 HYDROXY: CPT | Performed by: INTERNAL MEDICINE

## 2022-12-07 PROCEDURE — 80050 GENERAL HEALTH PANEL: CPT | Performed by: INTERNAL MEDICINE

## 2022-12-07 PROCEDURE — 86803 HEPATITIS C AB TEST: CPT | Performed by: INTERNAL MEDICINE

## 2022-12-07 PROCEDURE — 99396 PREV VISIT EST AGE 40-64: CPT | Performed by: INTERNAL MEDICINE

## 2022-12-07 PROCEDURE — 83735 ASSAY OF MAGNESIUM: CPT | Performed by: INTERNAL MEDICINE

## 2022-12-07 RX ORDER — ERGOCALCIFEROL (VITAMIN D2) 10 MCG
400 TABLET ORAL DAILY
COMMUNITY
End: 2023-03-06

## 2022-12-07 NOTE — ASSESSMENT & PLAN NOTE
Checking electrolyte levels today  Recommend increased water intake. Can substitute 1 serving of water with sugar free sports drink to help with electrolyte balance.

## 2022-12-07 NOTE — PROGRESS NOTES
"Chief Complaint  Annual Exam    Subjective       Susana Kim presents to [facility]    HPI   Presenting for annual physical    C/O trouble sleeping lately. Waking up around 3am and not able to go back to sleep.     C/O muscle cramping. Not drinking very much water. Has started taking vitamin B and D supplements.     Objective     Vitals:    12/07/22 0858   BP: 112/68   BP Location: Left arm   Patient Position: Sitting   Cuff Size: Adult   Pulse: 51   Temp: 98.3 °F (36.8 °C)   TempSrc: Temporal   SpO2: 99%   Weight: 74.2 kg (163 lb 9.6 oz)   Height: 170.2 cm (67\")      Wt Readings from Last 3 Encounters:   12/07/22 74.2 kg (163 lb 9.6 oz)   09/09/22 73.5 kg (162 lb)   08/22/22 73.7 kg (162 lb 6 oz)      BP Readings from Last 3 Encounters:   12/07/22 112/68   09/09/22 140/62   08/22/22 140/63        Body mass index is 25.62 kg/m².    BMI is >= 25 and <30. (Overweight) The following options were offered after discussion;: exercise counseling/recommendations and nutrition counseling/recommendations       Physical Exam  Vitals reviewed.   Constitutional:       Appearance: Normal appearance. She is well-developed.   HENT:      Head: Normocephalic and atraumatic.      Mouth/Throat:      Pharynx: No oropharyngeal exudate.   Eyes:      Conjunctiva/sclera: Conjunctivae normal.      Pupils: Pupils are equal, round, and reactive to light.   Cardiovascular:      Rate and Rhythm: Normal rate and regular rhythm.      Heart sounds: No murmur heard.    No friction rub. No gallop.   Pulmonary:      Effort: Pulmonary effort is normal.      Breath sounds: Normal breath sounds. No wheezing or rhonchi.   Skin:     General: Skin is warm and dry.   Neurological:      Mental Status: She is alert and oriented to person, place, and time.   Psychiatric:         Mood and Affect: Affect normal.          Result Review :   The following data was reviewed by: Pooja Nickerson MD on " 12/07/2022:        Procedures    Assessment and Plan   Diagnoses and all orders for this visit:    1. Annual visit for general adult medical examination without abnormal findings (Primary)  Assessment & Plan:  Screening labs reviewed/ordered  Counseling provided regarding age appropriate screenings and immunizations, healthy diet and exercise.     Orders:  -     Comprehensive Metabolic Panel  -     CBC & Differential  -     TSH  -     Lipid Panel  -     Magnesium    2. Essential hypertension  Assessment & Plan:  Well controlled in clinic today  Continue current management      3. PAF (paroxysmal atrial fibrillation) (HCC)  Assessment & Plan:  Followed by cardiology      4. Primary insomnia  Assessment & Plan:  Discussed sleep hygiene measures including consistent bedtime/wake time, no TV in bedroom, stopping all electronic devices 1 hour prior to bedtime.  Recommend trial of 3-10mg of melatonin taken 1 hour prior to bedtime.      5. Muscle cramp  Assessment & Plan:  Checking electrolyte levels today  Recommend increased water intake. Can substitute 1 serving of water with sugar free sports drink to help with electrolyte balance.       6. Vitamin D deficiency  -     Vitamin D,25-Hydroxy    7. Need for hepatitis C screening test  -     Hepatitis C Antibody        Follow Up   Return in about 1 year (around 12/7/2023) for Annual physical.  Patient was given instructions and counseling regarding her condition or for health maintenance advice. Please see specific information pulled into the AVS if appropriate.

## 2022-12-07 NOTE — ASSESSMENT & PLAN NOTE
Discussed sleep hygiene measures including consistent bedtime/wake time, no TV in bedroom, stopping all electronic devices 1 hour prior to bedtime.  Recommend trial of 3-10mg of melatonin taken 1 hour prior to bedtime.

## 2022-12-13 RX ORDER — HYDROCHLOROTHIAZIDE 12.5 MG/1
12.5 CAPSULE, GELATIN COATED ORAL DAILY
Qty: 30 CAPSULE | Refills: 0 | Status: SHIPPED | OUTPATIENT
Start: 2022-12-13 | End: 2022-12-28 | Stop reason: SDUPTHER

## 2022-12-28 RX ORDER — HYDROCHLOROTHIAZIDE 12.5 MG/1
12.5 CAPSULE, GELATIN COATED ORAL DAILY
Qty: 90 CAPSULE | Refills: 0 | Status: SHIPPED | OUTPATIENT
Start: 2022-12-28 | End: 2023-03-06 | Stop reason: SDUPTHER

## 2023-03-06 ENCOUNTER — OFFICE VISIT (OUTPATIENT)
Dept: CARDIOLOGY | Facility: CLINIC | Age: 63
End: 2023-03-06
Payer: COMMERCIAL

## 2023-03-06 VITALS
WEIGHT: 162 LBS | DIASTOLIC BLOOD PRESSURE: 64 MMHG | HEART RATE: 57 BPM | HEIGHT: 67 IN | SYSTOLIC BLOOD PRESSURE: 137 MMHG | BODY MASS INDEX: 25.43 KG/M2

## 2023-03-06 DIAGNOSIS — I10 ESSENTIAL HYPERTENSION: ICD-10-CM

## 2023-03-06 DIAGNOSIS — I48.0 PAF (PAROXYSMAL ATRIAL FIBRILLATION): Primary | ICD-10-CM

## 2023-03-06 PROCEDURE — 99214 OFFICE O/P EST MOD 30 MIN: CPT | Performed by: INTERNAL MEDICINE

## 2023-03-06 RX ORDER — HYDROCHLOROTHIAZIDE 12.5 MG/1
12.5 CAPSULE, GELATIN COATED ORAL DAILY
Qty: 30 CAPSULE | Refills: 11 | Status: SHIPPED | OUTPATIENT
Start: 2023-03-06

## 2023-03-06 RX ORDER — DILTIAZEM HYDROCHLORIDE 180 MG/1
180 CAPSULE, COATED, EXTENDED RELEASE ORAL DAILY
Qty: 30 CAPSULE | Refills: 11 | Status: SHIPPED | OUTPATIENT
Start: 2023-03-06

## 2023-03-06 NOTE — ASSESSMENT & PLAN NOTE
Patient primarily maintained normal sinus rhythm and doing well symptomatically continue to encourage exercise and avoidance of alcohol.  Patient is on Eliquis 5 twice daily for CVA but

## 2023-03-06 NOTE — PROGRESS NOTES
"Chief Complaint  PAF    Subjective    Patient with some brief tachycardic problems but no sustained dysrhythmia events.  She denies any change in breathing capacity and no anginal chest pain.  She has walking regularly and her weight has remained stable    Past Medical History:   Diagnosis Date   • Abnormal ECG 4/23/2015   • Arrhythmia 4/23/2015   • Atrial fibrillation (HCC)    • Bunion    • Essential hypertension 08/16/2021   • Hypertension    • PAF (paroxysmal atrial fibrillation) (HCC) 08/16/2021         Current Outpatient Medications:   •  Calcium 500-125 MG-UNIT tablet, Calcium 500 + D (D3) 500 mg(1,250mg) -125 unit oral tablet take 1 tablet by oral route daily   Suspended, Disp: , Rfl:   •  CYANOCOBALAMIN PO, Take  by mouth Daily., Disp: , Rfl:   •  apixaban (Eliquis) 5 MG tablet tablet, Take 1 tablet by mouth 2 (Two) Times a Day., Disp: 60 tablet, Rfl: 11  •  dilTIAZem CD (CARDIZEM CD) 180 MG 24 hr capsule, Take 1 capsule by mouth Daily., Disp: 30 capsule, Rfl: 11  •  hydroCHLOROthiazide (MICROZIDE) 12.5 MG capsule, Take 1 capsule by mouth Daily., Disp: 30 capsule, Rfl: 11    Medications Discontinued During This Encounter   Medication Reason   • Vitamin D, Cholecalciferol, (CHOLECALCIFEROL) 10 MCG (400 UNIT) tablet *Therapy completed   • thiamine (VITAMIN B-1) 100 MG tablet  tablet *Therapy completed   • dilTIAZem CD (CARDIZEM CD) 180 MG 24 hr capsule Reorder   • apixaban (Eliquis) 5 MG tablet tablet Reorder   • hydroCHLOROthiazide (MICROZIDE) 12.5 MG capsule Reorder     No Known Allergies     Social History     Tobacco Use   • Smoking status: Never   • Smokeless tobacco: Never   Vaping Use   • Vaping Use: Never used   Substance Use Topics   • Alcohol use: Not Currently   • Drug use: Never       Family History   Problem Relation Age of Onset   • Arrhythmia Sister    • Asthma Sister         Objective     /64   Pulse 57   Ht 170.2 cm (67\")   Wt 73.5 kg (162 lb)   BMI 25.37 kg/m²       Physical " Exam    General Appearance:   · no acute distress  · Alert and oriented x3  HENT:   · lips not cyanotic  · Atraumatic  Neck:  · No jvd   · supple  Respiratory:  · no respiratory distress  · normal breath sounds  · no rales  Cardiovascular:  · Regular rate and rhythm  · no S3, no S4   · no murmur  · no rub  Extremities  · No cyanosis  · lower extremity edema: none    Skin:   · warm, dry  · No rashes      Result Review :     No results found for: PROBNP  CMP    CMP 9/7/22 12/7/22   Glucose 89 93   BUN 16 13   Creatinine 0.80 0.79   eGFR 82.4 83.6   Sodium 143 142   Potassium 4.1 3.9   Chloride 103 104   Calcium 9.8 9.5   Total Protein  6.9   Albumin  4.40   Globulin  2.5   Total Bilirubin  0.5   Alkaline Phosphatase  98   AST (SGOT)  14   ALT (SGPT)  13   Albumin/Globulin Ratio  1.8   BUN/Creatinine Ratio 20.0 16.5   Anion Gap 10.9 8.8      Comments are available for some flowsheets but are not being displayed.           CBC w/diff    CBC w/Diff 12/7/22   WBC 5.34   RBC 4.18   Hemoglobin 13.3   Hematocrit 39.4   MCV 94.3   MCH 31.8   MCHC 33.8   RDW 12.0 (A)   Platelets 243   Neutrophil Rel % 57.9   Immature Granulocyte Rel % 0.2   Lymphocyte Rel % 29.2   Monocyte Rel % 10.1   Eosinophil Rel % 1.5   Basophil Rel % 1.1   (A) Abnormal value             Lab Results   Component Value Date    TSH 1.470 12/07/2022      No results found for: FREET4   No results found for: DDIMERQUANT  Magnesium   Date Value Ref Range Status   12/07/2022 2.1 1.6 - 2.4 mg/dL Final      No results found for: DIGOXIN   No results found for: TROPONINT        Lipid Panel    Lipid Panel 12/7/22   Total Cholesterol 229 (A)   Triglycerides 48   HDL Cholesterol 62 (A)   VLDL Cholesterol 8   LDL Cholesterol  159 (A)   LDL/HDL Ratio 2.54   (A) Abnormal value            No results found for: POCTROP                   Diagnoses and all orders for this visit:    1. PAF (paroxysmal atrial fibrillation) ([facility]) (Primary)  Assessment & Plan:  Patient primarily  maintained normal sinus rhythm and doing well symptomatically continue to encourage exercise and avoidance of alcohol.  Patient is on Eliquis 5 twice daily for CVA but    Orders:  -     apixaban (Eliquis) 5 MG tablet tablet; Take 1 tablet by mouth 2 (Two) Times a Day.  Dispense: 60 tablet; Refill: 11    2. Essential hypertension  Assessment & Plan:  Blood pressure controlled on HCTZ 12.5 daily and diltiazem 180 daily continue with current doses.    Orders:  -     dilTIAZem CD (CARDIZEM CD) 180 MG 24 hr capsule; Take 1 capsule by mouth Daily.  Dispense: 30 capsule; Refill: 11  -     hydroCHLOROthiazide (MICROZIDE) 12.5 MG capsule; Take 1 capsule by mouth Daily.  Dispense: 30 capsule; Refill: 11          Follow Up     Return in about 6 months (around 9/6/2023) for Follow with Amber Molina.          Patient was given instructions and counseling regarding her condition or for health maintenance advice. Please see specific information pulled into the AVS if appropriate.

## 2023-03-13 ENCOUNTER — TRANSCRIBE ORDERS (OUTPATIENT)
Dept: ADMINISTRATIVE | Facility: HOSPITAL | Age: 63
End: 2023-03-13
Payer: COMMERCIAL

## 2023-03-13 DIAGNOSIS — Z92.89 HISTORY OF MAMMOGRAPHY, SCREENING: Primary | ICD-10-CM

## 2023-06-13 ENCOUNTER — HOSPITAL ENCOUNTER (OUTPATIENT)
Dept: MAMMOGRAPHY | Facility: HOSPITAL | Age: 63
Discharge: HOME | End: 2023-06-13
Admitting: INTERNAL MEDICINE
Payer: COMMERCIAL

## 2023-06-13 DIAGNOSIS — Z92.89 HISTORY OF MAMMOGRAPHY, SCREENING: ICD-10-CM

## 2023-06-13 PROCEDURE — 77063 BREAST TOMOSYNTHESIS BI: CPT

## 2023-06-13 PROCEDURE — 77067 SCR MAMMO BI INCL CAD: CPT

## 2023-07-05 ENCOUNTER — OFFICE VISIT (OUTPATIENT)
Dept: INTERNAL MEDICINE | Facility: CLINIC | Age: 63
End: 2023-07-05
Payer: COMMERCIAL

## 2023-07-05 VITALS
BODY MASS INDEX: 25.64 KG/M2 | HEART RATE: 75 BPM | RESPIRATION RATE: 18 BRPM | TEMPERATURE: 97.8 F | WEIGHT: 163.38 LBS | DIASTOLIC BLOOD PRESSURE: 64 MMHG | HEIGHT: 67 IN | SYSTOLIC BLOOD PRESSURE: 118 MMHG | OXYGEN SATURATION: 98 %

## 2023-07-05 DIAGNOSIS — M25.50 ARTHRALGIA, UNSPECIFIED JOINT: Primary | ICD-10-CM

## 2023-07-05 DIAGNOSIS — R53.83 OTHER FATIGUE: ICD-10-CM

## 2023-07-05 LAB
25(OH)D3 SERPL-MCNC: 44.2 NG/ML (ref 30–100)
ALBUMIN SERPL-MCNC: 4.3 G/DL (ref 3.5–5.2)
ALBUMIN/GLOB SERPL: 1.7 G/DL
ALP SERPL-CCNC: 95 U/L (ref 39–117)
ALT SERPL W P-5'-P-CCNC: 16 U/L (ref 1–33)
ANION GAP SERPL CALCULATED.3IONS-SCNC: 10 MMOL/L (ref 5–15)
AST SERPL-CCNC: 19 U/L (ref 1–32)
BILIRUB SERPL-MCNC: 0.2 MG/DL (ref 0–1.2)
BUN SERPL-MCNC: 17 MG/DL (ref 8–23)
BUN/CREAT SERPL: 20.7 (ref 7–25)
CALCIUM SPEC-SCNC: 9.3 MG/DL (ref 8.6–10.5)
CHLORIDE SERPL-SCNC: 102 MMOL/L (ref 98–107)
CHOLEST SERPL-MCNC: 219 MG/DL (ref 0–200)
CO2 SERPL-SCNC: 30 MMOL/L (ref 22–29)
CREAT SERPL-MCNC: 0.82 MG/DL (ref 0.57–1)
EGFRCR SERPLBLD CKD-EPI 2021: 80 ML/MIN/1.73
GLOBULIN UR ELPH-MCNC: 2.6 GM/DL
GLUCOSE SERPL-MCNC: 97 MG/DL (ref 65–99)
HDLC SERPL-MCNC: 56 MG/DL (ref 40–60)
LDLC SERPL CALC-MCNC: 141 MG/DL (ref 0–100)
LDLC/HDLC SERPL: 2.47 {RATIO}
POTASSIUM SERPL-SCNC: 3.4 MMOL/L (ref 3.5–5.2)
PROT SERPL-MCNC: 6.9 G/DL (ref 6–8.5)
SODIUM SERPL-SCNC: 142 MMOL/L (ref 136–145)
T4 FREE SERPL-MCNC: 1.07 NG/DL (ref 0.93–1.7)
TRIGL SERPL-MCNC: 124 MG/DL (ref 0–150)
TSH SERPL DL<=0.05 MIU/L-ACNC: 1.34 UIU/ML (ref 0.27–4.2)
VIT B12 BLD-MCNC: 1656 PG/ML (ref 211–946)
VLDLC SERPL-MCNC: 22 MG/DL (ref 5–40)

## 2023-07-06 LAB
BASOPHILS # BLD AUTO: 0.08 10*3/MM3 (ref 0–0.2)
BASOPHILS NFR BLD AUTO: 1 % (ref 0–1.5)
DEPRECATED RDW RBC AUTO: 41.9 FL (ref 37–54)
EOSINOPHIL # BLD AUTO: 0.12 10*3/MM3 (ref 0–0.4)
EOSINOPHIL NFR BLD AUTO: 1.5 % (ref 0.3–6.2)
ERYTHROCYTE [DISTWIDTH] IN BLOOD BY AUTOMATED COUNT: 12.3 % (ref 12.3–15.4)
HCT VFR BLD AUTO: 39.8 % (ref 34–46.6)
HGB BLD-MCNC: 13.3 G/DL (ref 12–15.9)
IMM GRANULOCYTES # BLD AUTO: 0.02 10*3/MM3 (ref 0–0.05)
IMM GRANULOCYTES NFR BLD AUTO: 0.2 % (ref 0–0.5)
LYMPHOCYTES # BLD AUTO: 2.13 10*3/MM3 (ref 0.7–3.1)
LYMPHOCYTES NFR BLD AUTO: 26.4 % (ref 19.6–45.3)
MCH RBC QN AUTO: 31.1 PG (ref 26.6–33)
MCHC RBC AUTO-ENTMCNC: 33.4 G/DL (ref 31.5–35.7)
MCV RBC AUTO: 93 FL (ref 79–97)
MONOCYTES # BLD AUTO: 0.76 10*3/MM3 (ref 0.1–0.9)
MONOCYTES NFR BLD AUTO: 9.4 % (ref 5–12)
NEUTROPHILS NFR BLD AUTO: 4.95 10*3/MM3 (ref 1.7–7)
NEUTROPHILS NFR BLD AUTO: 61.5 % (ref 42.7–76)
NRBC BLD AUTO-RTO: 0 /100 WBC (ref 0–0.2)
PLATELET # BLD AUTO: 273 10*3/MM3 (ref 140–450)
PMV BLD AUTO: 11 FL (ref 6–12)
RBC # BLD AUTO: 4.28 10*6/MM3 (ref 3.77–5.28)
WBC NRBC COR # BLD: 8.06 10*3/MM3 (ref 3.4–10.8)

## 2023-07-07 LAB — B BURGDOR IGG+IGM SER QL IA: NEGATIVE

## 2023-07-09 LAB — R RICKETTSI IGM SER-ACNC: 0.95 INDEX (ref 0–0.89)

## 2023-07-12 LAB
R RICKETTSI IGG SER QL IA: POSITIVE
R RICKETTSI IGG TITR SER IF: NORMAL {TITER}

## 2023-07-13 LAB
A PHAGOCYTOPH IGG TITR SER IF: NEGATIVE {TITER}
A PHAGOCYTOPH IGM TITR SER IF: NEGATIVE {TITER}
E CHAFFEENSIS IGG TITR SER IF: NEGATIVE {TITER}
E CHAFFEENSIS IGM TITR SER IF: NEGATIVE {TITER}

## 2023-08-02 PROBLEM — M25.50 ARTHRALGIA: Status: ACTIVE | Noted: 2023-08-02

## 2023-08-02 PROBLEM — R53.83 OTHER FATIGUE: Status: ACTIVE | Noted: 2023-08-02

## 2023-09-01 PROBLEM — Z00.00 ANNUAL VISIT FOR GENERAL ADULT MEDICAL EXAMINATION WITHOUT ABNORMAL FINDINGS: Status: RESOLVED | Noted: 2022-12-07 | Resolved: 2023-09-01

## 2023-09-06 ENCOUNTER — OFFICE VISIT (OUTPATIENT)
Dept: CARDIOLOGY | Facility: CLINIC | Age: 63
End: 2023-09-06
Payer: MEDICARE

## 2023-09-06 VITALS
BODY MASS INDEX: 25.9 KG/M2 | HEART RATE: 58 BPM | SYSTOLIC BLOOD PRESSURE: 120 MMHG | HEIGHT: 67 IN | WEIGHT: 165 LBS | DIASTOLIC BLOOD PRESSURE: 60 MMHG

## 2023-09-06 DIAGNOSIS — I10 ESSENTIAL HYPERTENSION: ICD-10-CM

## 2023-09-06 DIAGNOSIS — E87.6 HYPOKALEMIA: ICD-10-CM

## 2023-09-06 DIAGNOSIS — I48.0 PAF (PAROXYSMAL ATRIAL FIBRILLATION): Primary | ICD-10-CM

## 2023-09-06 RX ORDER — POTASSIUM CHLORIDE 750 MG/1
10 TABLET, FILM COATED, EXTENDED RELEASE ORAL DAILY
Qty: 30 TABLET | Refills: 3 | Status: SHIPPED | OUTPATIENT
Start: 2023-09-06

## 2023-09-06 NOTE — PROGRESS NOTES
Chief Complaint  Follow-up and Hypertension    Subjective            History of Present Illness  Susana Kim is a 63-year-old white/ female patient who presents to the office today for follow-up.  She has paroxysmal atrial fibrillation and hypertension.  She is compliant with medications.  She denies any chest pain, shortness of breath, lightheadedness/dizziness, palpitations, or edema.  Her only complaint today is increased amounts of fatigue.    PMH  Past Medical History:   Diagnosis Date    Bunion     Essential hypertension 2021    PAF (paroxysmal atrial fibrillation) 2021         ALLERGY  No Known Allergies       SURGICALHX  Past Surgical History:   Procedure Laterality Date     SECTION      COLONOSCOPY  ,    ENDOSCOPY            SOC  Social History     Socioeconomic History    Marital status:    Tobacco Use    Smoking status: Never    Smokeless tobacco: Never   Vaping Use    Vaping Use: Never used   Substance and Sexual Activity    Alcohol use: Not Currently    Drug use: Never    Sexual activity: Defer         FAMHX  Family History   Problem Relation Age of Onset    Arrhythmia Sister     Asthma Sister           MEDSIGONLY  Current Outpatient Medications on File Prior to Visit   Medication Sig    apixaban (Eliquis) 5 MG tablet tablet Take 1 tablet by mouth 2 (Two) Times a Day.    Calcium 500-125 MG-UNIT tablet Calcium 500 + D (D3) 500 mg(1,250mg) -125 unit oral tablet take 1 tablet by oral route daily   Suspended    dilTIAZem CD (CARDIZEM CD) 180 MG 24 hr capsule Take 1 capsule by mouth Daily.    hydroCHLOROthiazide (MICROZIDE) 12.5 MG capsule Take 1 capsule by mouth Daily.    [DISCONTINUED] CYANOCOBALAMIN PO Take  by mouth Daily. (Patient not taking: Reported on 2023)     No current facility-administered medications on file prior to visit.         Objective   /60 (BP Location: Left arm, Patient Position: Sitting, Cuff Size: Adult)   Pulse 58    "Ht 170.2 cm (67\")   Wt 74.8 kg (165 lb)   BMI 25.84 kg/m²       Physical Exam  HENT:      Head: Normocephalic.   Neck:      Vascular: No carotid bruit.   Cardiovascular:      Rate and Rhythm: Regular rhythm. Bradycardia present.      Pulses: Normal pulses.      Heart sounds: Normal heart sounds. No murmur heard.  Pulmonary:      Effort: Pulmonary effort is normal.      Breath sounds: Normal breath sounds.   Musculoskeletal:      Cervical back: Neck supple.      Right lower leg: No edema.      Left lower leg: No edema.   Skin:     General: Skin is dry.   Neurological:      Mental Status: She is alert and oriented to person, place, and time.   Psychiatric:         Behavior: Behavior normal.     Result Review :   The following data was reviewed by: LYDAI Rendon on 09/06/2023:  No results found for: PROBNP  CMP          7/5/2023    16:53   CMP   Glucose 97    BUN 17    Creatinine 0.82    EGFR 80.0    Sodium 142    Potassium 3.4    Chloride 102    Calcium 9.3    Total Protein 6.9    Albumin 4.3    Globulin 2.6    Total Bilirubin 0.2    Alkaline Phosphatase 95    AST (SGOT) 19    ALT (SGPT) 16    Albumin/Globulin Ratio 1.7    BUN/Creatinine Ratio 20.7    Anion Gap 10.0      CBC w/diff          7/5/2023    16:53   CBC w/Diff   WBC 8.06    RBC 4.28    Hemoglobin 13.3    Hematocrit 39.8    MCV 93.0    MCH 31.1    MCHC 33.4    RDW 12.3    Platelets 273    Neutrophil Rel % 61.5    Immature Granulocyte Rel % 0.2    Lymphocyte Rel % 26.4    Monocyte Rel % 9.4    Eosinophil Rel % 1.5    Basophil Rel % 1.0       Lab Results   Component Value Date    TSH 1.340 07/05/2023      Lab Results   Component Value Date    FREET4 1.07 07/05/2023      No results found for: DDIMERQUANT  Magnesium   Date Value Ref Range Status   12/07/2022 2.1 1.6 - 2.4 mg/dL Final      No results found for: DIGOXIN   No results found for: TROPONINT        Lipid Panel          7/5/2023    16:53   Lipid Panel   Total Cholesterol 219    Triglycerides " 124    HDL Cholesterol 56    VLDL Cholesterol 22    LDL Cholesterol  141    LDL/HDL Ratio 2.47    The 10-year ASCVD risk score (Annamarie CASTORENA, et al., 2019) is: 6.8%    Values used to calculate the score:      Age: 63 years      Sex: Female      Is Non- : No      Diabetic: No      Tobacco smoker: No      Systolic Blood Pressure: 120 mmHg      Is BP treated: Yes      HDL Cholesterol: 56 mg/dL      Total Cholesterol: 219 mg/dL    [id number] Score: 3        Assessment and Plan    Diagnoses and all orders for this visit:    1. PAF (paroxysmal atrial fibrillation) (Primary)  Symptomatically stable at this time, continue diltiazem 180 mg daily.  Continue Eliquis for CVA prevention.    2. Essential hypertension  Currently controlled and without adverse effects from medication, the only complaint she has today is of fatigue.  I told her she could try coming off the hydrochlorothiazide for 1 week to see if her symptoms improve.  She is going to monitor her blood pressure for this week to see if this has any effect on her fatigue.  If her fatigue does not improve in 1 week then she will restart the hydrochlorothiazide.    3. Hypokalemia  Her potassium level was low on her most recent set of labs, start potassium chloride 10 mill equivalent daily and repeat BMP in 2 weeks.  -     Basic Metabolic Panel; Future    Other orders  -     potassium chloride 10 MEQ CR tablet; Take 1 tablet by mouth Daily.  Dispense: 30 tablet; Refill: 3            Follow Up   Return in about 6 months (around 3/6/2024) for Follow up with Dr Potter.    Patient was given instructions and counseling regarding her condition or for health maintenance advice. Please see specific information pulled into the AVS if appropriate.     Susana Kim  reports that she has never smoked. She has never used smokeless tobacco.           Amber Molina, APRCHEIKH  09/06/23  10:04 EDT    Dictated Utilizing Dragon Dictation

## 2023-09-20 ENCOUNTER — TELEPHONE (OUTPATIENT)
Dept: CARDIOLOGY | Facility: CLINIC | Age: 63
End: 2023-09-20
Payer: MEDICARE

## 2023-09-20 NOTE — TELEPHONE ENCOUNTER
----- Message from LYDIA Mcgill sent at 9/20/2023 10:29 AM EDT -----  BP log looks great, see if her fatigue improved with stopping the hctz  ----- Message -----  From: Rina Blackmon RegSched Rep  Sent: 9/20/2023   9:51 AM EDT  To: LYDIA Mcgill

## 2023-12-11 ENCOUNTER — OFFICE VISIT (OUTPATIENT)
Dept: INTERNAL MEDICINE | Facility: CLINIC | Age: 63
End: 2023-12-11
Payer: MEDICARE

## 2023-12-11 ENCOUNTER — HOSPITAL ENCOUNTER (OUTPATIENT)
Dept: GENERAL RADIOLOGY | Facility: HOSPITAL | Age: 63
Discharge: HOME | End: 2023-12-11
Payer: MEDICARE

## 2023-12-11 VITALS
HEIGHT: 67 IN | RESPIRATION RATE: 18 BRPM | SYSTOLIC BLOOD PRESSURE: 128 MMHG | BODY MASS INDEX: 26.49 KG/M2 | OXYGEN SATURATION: 96 % | TEMPERATURE: 97.9 F | DIASTOLIC BLOOD PRESSURE: 74 MMHG | WEIGHT: 168.8 LBS | HEART RATE: 88 BPM

## 2023-12-11 DIAGNOSIS — M54.41 CHRONIC BILATERAL LOW BACK PAIN WITH RIGHT-SIDED SCIATICA: ICD-10-CM

## 2023-12-11 DIAGNOSIS — R41.3 MEMORY DEFICIT: ICD-10-CM

## 2023-12-11 DIAGNOSIS — Z00.00 ENCOUNTER FOR ANNUAL WELLNESS EXAM IN MEDICARE PATIENT: Primary | ICD-10-CM

## 2023-12-11 DIAGNOSIS — M25.521 RIGHT ELBOW PAIN: ICD-10-CM

## 2023-12-11 DIAGNOSIS — G89.29 CHRONIC BILATERAL LOW BACK PAIN WITH RIGHT-SIDED SCIATICA: ICD-10-CM

## 2023-12-11 LAB
TSH SERPL DL<=0.05 MIU/L-ACNC: 1.94 UIU/ML (ref 0.27–4.2)
VIT B12 BLD-MCNC: 705 PG/ML (ref 211–946)

## 2023-12-11 PROCEDURE — 84443 ASSAY THYROID STIM HORMONE: CPT | Performed by: NURSE PRACTITIONER

## 2023-12-11 PROCEDURE — 82607 VITAMIN B-12: CPT | Performed by: NURSE PRACTITIONER

## 2023-12-11 PROCEDURE — 86592 SYPHILIS TEST NON-TREP QUAL: CPT | Performed by: NURSE PRACTITIONER

## 2023-12-11 PROCEDURE — 73070 X-RAY EXAM OF ELBOW: CPT

## 2023-12-11 PROCEDURE — 72110 X-RAY EXAM L-2 SPINE 4/>VWS: CPT

## 2023-12-11 RX ORDER — CHLORAL HYDRATE 500 MG
1000 CAPSULE ORAL
COMMUNITY

## 2023-12-11 NOTE — PROGRESS NOTES
The ABCs of the Annual Wellness Visit  Subsequent Medicare Wellness Visit    Subjective    Susana Kim is a 63 y.o. female who presents for a Subsequent Medicare Wellness Visit.    The following portions of the patient's history were reviewed and   updated as appropriate: allergies, current medications, past family history, past medical history, past social history, past surgical history, and problem list.    Compared to one year ago, the patient feels her physical   health is worse.    Compared to one year ago, the patient feels her mental   health is the same.    Recent Hospitalizations:  She was not admitted to the hospital during the last year.       Current Medical Providers:  Patient Care Team:  Pooja Nickerson MD as PCP - General (Pediatrics)    Outpatient Medications Prior to Visit   Medication Sig Dispense Refill    apixaban (Eliquis) 5 MG tablet tablet Take 1 tablet by mouth 2 (Two) Times a Day. 60 tablet 11    Calcium 500-125 MG-UNIT tablet Calcium 500 + D (D3) 500 mg(1,250mg) -125 unit oral tablet take 1 tablet by oral route daily   Suspended      dilTIAZem CD (CARDIZEM CD) 180 MG 24 hr capsule Take 1 capsule by mouth Daily. 30 capsule 11    Omega-3 Fatty Acids (fish oil) 1000 MG capsule capsule Take 1 capsule by mouth Daily With Breakfast.      potassium chloride 10 MEQ CR tablet Take 1 tablet by mouth Daily. 30 tablet 3    hydroCHLOROthiazide (MICROZIDE) 12.5 MG capsule Take 1 capsule by mouth Daily. 30 capsule 11     No facility-administered medications prior to visit.       No opioid medication identified on active medication list. I have reviewed chart for other potential  high risk medication/s and harmful drug interactions in the elderly.        Aspirin is not on active medication list.  Aspirin use is not indicated based on review of current medical condition/s. Risk of harm outweighs potential benefits.  .    Patient Active Problem List   Diagnosis    PAF (paroxysmal atrial  "fibrillation)    Essential hypertension    Bunion    Primary insomnia    Muscle cramp    Arthralgia    Other fatigue     Advance Care Planning   Advance Care Planning     Advance Directive is not on file.  ACP discussion was held with the patient during this visit. Patient does not have an advance directive, information provided.     Objective    Vitals:    23 1336   BP: 128/74   BP Location: Left arm   Patient Position: Sitting   Cuff Size: Adult   Pulse: 88   Resp: 18   Temp: 97.9 °F (36.6 °C)   SpO2: 96%   Weight: 76.6 kg (168 lb 12.8 oz)   Height: 170.2 cm (67\")     Estimated body mass index is 26.44 kg/m² as calculated from the following:    Height as of this encounter: 170.2 cm (67\").    Weight as of this encounter: 76.6 kg (168 lb 12.8 oz).           Does the patient have evidence of cognitive impairment? Yes          HEALTH RISK ASSESSMENT    Smoking Status:  Social History     Tobacco Use   Smoking Status Never   Smokeless Tobacco Never     Alcohol Consumption:  Social History     Substance and Sexual Activity   Alcohol Use Not Currently     Fall Risk Screen:    STEADI Fall Risk Assessment was completed, and patient is at LOW risk for falls.Assessment completed on:2023    Depression Screenin/11/2023     1:43 PM   PHQ-2/PHQ-9 Depression Screening   Little Interest or Pleasure in Doing Things 0-->not at all   Feeling Down, Depressed or Hopeless 0-->not at all   PHQ-9: Brief Depression Severity Measure Score 0       Health Habits and Functional and Cognitive Screenin/10/2023     8:09 PM   Functional & Cognitive Status   Do you have difficulty preparing food and eating? No    No   Do you have difficulty bathing yourself, getting dressed or grooming yourself? No    No   Do you have difficulty using the toilet? No    No   Do you have difficulty moving around from place to place? No    No   Do you have trouble with steps or getting out of a bed or a chair? No    No   Current Diet " Other    Other   Dental Exam Up to date    Up to date   Eye Exam Up to date    Up to date   Exercise (times per week) 2 times per week    2 times per week   Current Exercises Include Walking    Walking   Do you need help using the phone?  No    No   Are you deaf or do you have serious difficulty hearing?  No    No   Do you need help to go to places out of walking distance? No    No   Do you need help shopping? No    No   Do you need help preparing meals?  No    No   Do you need help with housework?  No    No   Do you need help with laundry? No    No   Do you need help taking your medications? No    No   Do you need help managing money? No    No   Do you ever drive or ride in a car without wearing a seat belt? No    No   Have you felt unusual stress, anger or loneliness in the last month? No    No   Who do you live with? Spouse    Spouse   If you need help, do you have trouble finding someone available to you? No    No   Have you been bothered in the last four weeks by sexual problems? No    No   Do you have difficulty concentrating, remembering or making decisions? Yes    Yes       Age-appropriate Screening Schedule:  Refer to the list below for future screening recommendations based on patient's age, sex and/or medical conditions. Orders for these recommended tests are listed in the plan section. The patient has been provided with a written plan.    Health Maintenance   Topic Date Due    DXA SCAN  Never done    TDAP/TD VACCINES (1 - Tdap) Never done    ZOSTER VACCINE (1 of 2) Never done    INFLUENZA VACCINE  08/01/2023    Pneumococcal Vaccine 65+ (1 - PCV) Never done    COVID-19 Vaccine (4 - 2023-24 season) 09/01/2023    BMI FOLLOWUP  07/05/2024    ANNUAL WELLNESS VISIT  12/11/2024    MAMMOGRAM  06/13/2025    COLORECTAL CANCER SCREENING  09/25/2025    HEPATITIS C SCREENING  Completed                  CMS Preventative Services Quick Reference  Risk Factors Identified During Encounter  Chronic Pain: Natural history  "and expected course discussed. Questions answered.  Immunizations Discussed/Encouraged: Shingrix  The above risks/problems have been discussed with the patient.  Pertinent information has been shared with the patient in the After Visit Summary.  An After Visit Summary and PPPS were made available to the patient.    Follow Up:   Next Medicare Wellness visit to be scheduled in 1 year.       Additional E&M Note during same encounter follows:  Patient has multiple medical problems which are significant and separately identifiable that require additional work above and beyond the Medicare Wellness Visit.      Chief Complaint  Medicare Wellness-subsequent, Elbow Pain (Left- worsened since August ), Back Pain (Lower back- worsened since August ), and Speech Problem (Concerns about delayed speech- over a year-  has noticed )    Subjective          Susana Kim is also being seen today for   She reports left elbow pain for the last 5 mths. She reports sharp pains with picking up items and with making a fist  She denies injury  She reports pain has continued intermittently    She reports ongoing back pain for years. Occasionally pain is radiating down her legs. She reports doing PT exercises at home. Ice helping    She reports having some days where she says the wrong word.   She reports occasionally forgetting things at times    Review of Systems   Musculoskeletal:  Positive for back pain.       Objective   Vital Signs:  /74 (BP Location: Left arm, Patient Position: Sitting, Cuff Size: Adult)   Pulse 88   Temp 97.9 °F (36.6 °C)   Resp 18   Ht 170.2 cm (67\")   Wt 76.6 kg (168 lb 12.8 oz)   SpO2 96%   BMI 26.44 kg/m²     Physical Exam  Vitals and nursing note reviewed.   Constitutional:       General: She is not in acute distress.     Appearance: Normal appearance.   HENT:      Head: Normocephalic and atraumatic.      Right Ear: External ear normal.      Left Ear: External ear normal.      Nose: Nose " normal.      Mouth/Throat:      Mouth: Mucous membranes are moist.   Eyes:      Conjunctiva/sclera: Conjunctivae normal.   Cardiovascular:      Rate and Rhythm: Normal rate and regular rhythm.      Pulses: Normal pulses.      Heart sounds: Normal heart sounds. No murmur heard.     No friction rub. No gallop.   Pulmonary:      Effort: Pulmonary effort is normal. No respiratory distress.      Breath sounds: No wheezing, rhonchi or rales.   Musculoskeletal:      Left elbow: No swelling, deformity or effusion. Normal range of motion. Tenderness present in olecranon process.      Cervical back: Neck supple.      Right lower leg: No edema.      Left lower leg: No edema.      Comments: No significant tenderness of epicondyles   Skin:     General: Skin is warm and dry.   Neurological:      General: No focal deficit present.      Mental Status: She is alert and oriented to person, place, and time.   Psychiatric:         Mood and Affect: Mood normal.         Behavior: Behavior normal.                         Assessment and Plan   Diagnoses and all orders for this visit:    1. Encounter for annual wellness exam in Medicare patient (Primary)    2. Chronic bilateral low back pain with right-sided sciatica  Comments:  will get xray and consider MRI following given failure to improve at home  Orders:  -     XR Spine Lumbar 4+ View (In Office)    3. Right elbow pain  Comments:  will get xray given unremarkable exam and prolonged pain  Orders:  -     XR Elbow 2 View Left (In Office)    4. Memory deficit  Comments:  MOCA 30/30. labs today. discussed multiple causes for memory issues  Orders:  -     TSH  -     Vitamin B12  -     RPR             Follow Up   Return in about 6 weeks (around 1/22/2024).  Patient was given instructions and counseling regarding her condition or for health maintenance advice. Please see specific information pulled into the AVS if appropriate.

## 2023-12-12 LAB — RPR SER QL: NORMAL

## 2023-12-18 DIAGNOSIS — R93.89 ABNORMAL X-RAY: Primary | ICD-10-CM

## 2023-12-28 ENCOUNTER — PATIENT MESSAGE (OUTPATIENT)
Dept: CARDIOLOGY | Facility: CLINIC | Age: 63
End: 2023-12-28
Payer: MEDICARE

## 2023-12-28 RX ORDER — POTASSIUM CHLORIDE 750 MG/1
10 TABLET, FILM COATED, EXTENDED RELEASE ORAL DAILY
Qty: 30 TABLET | Refills: 3 | Status: SHIPPED | OUTPATIENT
Start: 2023-12-28

## 2023-12-29 ENCOUNTER — HOSPITAL ENCOUNTER (OUTPATIENT)
Dept: CT IMAGING | Facility: HOSPITAL | Age: 63
Discharge: HOME | End: 2023-12-29
Admitting: INTERNAL MEDICINE
Payer: MEDICARE

## 2023-12-29 DIAGNOSIS — R93.89 ABNORMAL X-RAY: ICD-10-CM

## 2023-12-29 PROCEDURE — 74176 CT ABD & PELVIS W/O CONTRAST: CPT

## 2024-01-22 ENCOUNTER — OFFICE VISIT (OUTPATIENT)
Dept: INTERNAL MEDICINE | Facility: CLINIC | Age: 64
End: 2024-01-22
Payer: MEDICARE

## 2024-01-22 VITALS
OXYGEN SATURATION: 97 % | DIASTOLIC BLOOD PRESSURE: 68 MMHG | BODY MASS INDEX: 26.23 KG/M2 | RESPIRATION RATE: 18 BRPM | HEIGHT: 67 IN | SYSTOLIC BLOOD PRESSURE: 116 MMHG | HEART RATE: 61 BPM | TEMPERATURE: 98.4 F | WEIGHT: 167.13 LBS

## 2024-01-22 DIAGNOSIS — G89.29 CHRONIC BILATERAL LOW BACK PAIN WITH RIGHT-SIDED SCIATICA: ICD-10-CM

## 2024-01-22 DIAGNOSIS — R93.5 ABNORMAL CT SCAN, PELVIS: Primary | ICD-10-CM

## 2024-01-22 DIAGNOSIS — M54.41 CHRONIC BILATERAL LOW BACK PAIN WITH RIGHT-SIDED SCIATICA: ICD-10-CM

## 2024-01-22 PROCEDURE — 3078F DIAST BP <80 MM HG: CPT | Performed by: INTERNAL MEDICINE

## 2024-01-22 PROCEDURE — 1159F MED LIST DOCD IN RCRD: CPT | Performed by: INTERNAL MEDICINE

## 2024-01-22 PROCEDURE — 1160F RVW MEDS BY RX/DR IN RCRD: CPT | Performed by: INTERNAL MEDICINE

## 2024-01-22 PROCEDURE — 3074F SYST BP LT 130 MM HG: CPT | Performed by: INTERNAL MEDICINE

## 2024-01-22 PROCEDURE — 99213 OFFICE O/P EST LOW 20 MIN: CPT | Performed by: INTERNAL MEDICINE

## 2024-01-22 RX ORDER — CYCLOBENZAPRINE HCL 5 MG
5 TABLET ORAL 3 TIMES DAILY PRN
Qty: 30 TABLET | Refills: 0 | Status: SHIPPED | OUTPATIENT
Start: 2024-01-22

## 2024-01-22 NOTE — PROGRESS NOTES
"Chief Complaint  Follow-up (6 week follow up for elbow and back pain and go over labs )    Subjective       Susana Kim presents to [facility]    HPI   Presenting for follow up of back pain.   She is having worsening pain.   Reviewed results of Xray L spine showing moderate DDD.   She states that she has previously done a course of physical therapy years ago. She got out her exercises when she started having increased pain, but this has not helped. States that she does not want to go to PT again.     Discussed findings of CT abd/pelvis. Discussed recommendation to have contrast CT to further evaluation findings.     Objective     Vitals:    01/22/24 1056   BP: 116/68   BP Location: Left arm   Patient Position: Sitting   Cuff Size: Adult   Pulse: 61   Resp: 18   Temp: 98.4 °F (36.9 °C)   TempSrc: Temporal   SpO2: 97%   Weight: 75.8 kg (167 lb 2 oz)   Height: 170.2 cm (67\")      Wt Readings from Last 3 Encounters:   01/22/24 75.8 kg (167 lb 2 oz)   12/11/23 76.6 kg (168 lb 12.8 oz)   09/06/23 74.8 kg (165 lb)      BP Readings from Last 3 Encounters:   01/22/24 116/68   12/11/23 128/74   09/06/23 120/60        Body mass index is 26.18 kg/m².    Physical Exam  Vitals reviewed.   Constitutional:       Appearance: Normal appearance. She is well-developed.   HENT:      Head: Normocephalic and atraumatic.      Mouth/Throat:      Pharynx: No oropharyngeal exudate.   Eyes:      Conjunctiva/sclera: Conjunctivae normal.      Pupils: Pupils are equal, round, and reactive to light.   Neck:      Thyroid: No thyromegaly or thyroid tenderness.   Cardiovascular:      Rate and Rhythm: Normal rate and regular rhythm.      Heart sounds: No murmur heard.     No friction rub. No gallop.   Pulmonary:      Effort: Pulmonary effort is normal.      Breath sounds: Normal breath sounds. No wheezing or rhonchi.   Lymphadenopathy:      Cervical: No cervical adenopathy.   Skin:     General: " Skin is warm and dry.   Neurological:      Mental Status: She is alert and oriented to person, place, and time.   Psychiatric:         Mood and Affect: Affect normal.          Result Review :   The following data was reviewed by: Pooja Nickerson MD on 01/22/2024:    Radiologic studies Xray Lspine. CT abd/pelvis    Procedures    Assessment and Plan   Diagnoses and all orders for this visit:    1. Abnormal CT scan, pelvis (Primary)  -     CT Abdomen Pelvis With Contrast; Future    2. Chronic bilateral low back pain with right-sided sciatica  Comments:  Discussed conservative management with NSAIDs and muscle relaxer. Hold off on PT at this time.    Other orders  -     cyclobenzaprine (FLEXERIL) 5 MG tablet; Take 1 tablet by mouth 3 (Three) Times a Day As Needed for Muscle Spasms.  Dispense: 30 tablet; Refill: 0          Follow Up   Return if symptoms worsen or fail to improve.  Patient was given instructions and counseling regarding her condition or for health maintenance advice. Please see specific information pulled into the AVS if appropriate.

## 2024-01-31 ENCOUNTER — HOSPITAL ENCOUNTER (OUTPATIENT)
Dept: CT IMAGING | Facility: HOSPITAL | Age: 64
Discharge: HOME | End: 2024-01-31
Admitting: INTERNAL MEDICINE
Payer: MEDICARE

## 2024-01-31 DIAGNOSIS — R93.5 ABNORMAL CT SCAN, PELVIS: ICD-10-CM

## 2024-01-31 LAB
CREAT BLDA-MCNC: 0.8 MG/DL (ref 0.6–1.3)
EGFRCR SERPLBLD CKD-EPI 2021: 81.9 ML/MIN/1.73

## 2024-01-31 PROCEDURE — 74177 CT ABD & PELVIS W/CONTRAST: CPT

## 2024-01-31 PROCEDURE — 25510000001 IOPAMIDOL PER 1 ML: Performed by: INTERNAL MEDICINE

## 2024-01-31 PROCEDURE — 82565 ASSAY OF CREATININE: CPT

## 2024-01-31 RX ADMIN — IOPAMIDOL 100 ML: 755 INJECTION, SOLUTION INTRAVENOUS at 08:52

## 2024-03-03 DIAGNOSIS — I10 ESSENTIAL HYPERTENSION: ICD-10-CM

## 2024-03-04 RX ORDER — DILTIAZEM HYDROCHLORIDE 180 MG/1
180 CAPSULE, COATED, EXTENDED RELEASE ORAL DAILY
Qty: 90 CAPSULE | Refills: 0 | Status: SHIPPED | OUTPATIENT
Start: 2024-03-04 | End: 2024-03-05 | Stop reason: SDUPTHER

## 2024-03-05 DIAGNOSIS — I10 ESSENTIAL HYPERTENSION: ICD-10-CM

## 2024-03-05 RX ORDER — DILTIAZEM HYDROCHLORIDE 180 MG/1
180 CAPSULE, COATED, EXTENDED RELEASE ORAL DAILY
Qty: 90 CAPSULE | Refills: 0 | Status: SHIPPED | OUTPATIENT
Start: 2024-03-05

## 2024-03-05 NOTE — TELEPHONE ENCOUNTER
PT CALLED AND REQUESTED DILTIAZEM REFILL BE SENT TO [address] AS SHE NO LONGER USES [facility].  REFILL SENT.

## 2024-03-13 ENCOUNTER — LAB (OUTPATIENT)
Dept: LAB | Facility: HOSPITAL | Age: 64
End: 2024-03-13
Payer: MEDICARE

## 2024-03-13 DIAGNOSIS — E87.6 HYPOKALEMIA: ICD-10-CM

## 2024-03-13 LAB
ANION GAP SERPL CALCULATED.3IONS-SCNC: 7 MMOL/L (ref 5–15)
BUN SERPL-MCNC: 20 MG/DL (ref 8–23)
BUN/CREAT SERPL: 21.3 (ref 7–25)
CALCIUM SPEC-SCNC: 9.6 MG/DL (ref 8.6–10.5)
CHLORIDE SERPL-SCNC: 103 MMOL/L (ref 98–107)
CO2 SERPL-SCNC: 31 MMOL/L (ref 22–29)
CREAT SERPL-MCNC: 0.94 MG/DL (ref 0.57–1)
EGFRCR SERPLBLD CKD-EPI 2021: 67.5 ML/MIN/1.73
GLUCOSE SERPL-MCNC: 96 MG/DL (ref 65–99)
POTASSIUM SERPL-SCNC: 4 MMOL/L (ref 3.5–5.2)
SODIUM SERPL-SCNC: 141 MMOL/L (ref 136–145)

## 2024-03-13 PROCEDURE — 80048 BASIC METABOLIC PNL TOTAL CA: CPT

## 2024-03-13 PROCEDURE — 36415 COLL VENOUS BLD VENIPUNCTURE: CPT

## 2024-03-14 ENCOUNTER — TELEPHONE (OUTPATIENT)
Dept: CARDIOLOGY | Facility: CLINIC | Age: 64
End: 2024-03-14
Payer: MEDICARE

## 2024-03-14 NOTE — TELEPHONE ENCOUNTER
----- Message from LYDIA Mcgill sent at 3/14/2024  9:17 AM EDT -----  Potassium is improved, continue current meds

## 2024-03-21 ENCOUNTER — OFFICE VISIT (OUTPATIENT)
Dept: CARDIOLOGY | Facility: CLINIC | Age: 64
End: 2024-03-21
Payer: MEDICARE

## 2024-03-21 VITALS
SYSTOLIC BLOOD PRESSURE: 159 MMHG | BODY MASS INDEX: 26.53 KG/M2 | HEART RATE: 57 BPM | WEIGHT: 169 LBS | HEIGHT: 67 IN | DIASTOLIC BLOOD PRESSURE: 68 MMHG

## 2024-03-21 DIAGNOSIS — I48.0 PAF (PAROXYSMAL ATRIAL FIBRILLATION): ICD-10-CM

## 2024-03-21 DIAGNOSIS — I10 ESSENTIAL HYPERTENSION: ICD-10-CM

## 2024-03-21 PROCEDURE — 3077F SYST BP >= 140 MM HG: CPT | Performed by: INTERNAL MEDICINE

## 2024-03-21 PROCEDURE — 3078F DIAST BP <80 MM HG: CPT | Performed by: INTERNAL MEDICINE

## 2024-03-21 PROCEDURE — 99214 OFFICE O/P EST MOD 30 MIN: CPT | Performed by: INTERNAL MEDICINE

## 2024-03-21 RX ORDER — DILTIAZEM HYDROCHLORIDE 180 MG/1
180 CAPSULE, COATED, EXTENDED RELEASE ORAL DAILY
Qty: 90 CAPSULE | Refills: 3 | Status: SHIPPED | OUTPATIENT
Start: 2024-03-21

## 2024-03-21 RX ORDER — VALSARTAN 80 MG/1
80 TABLET ORAL DAILY
Qty: 90 TABLET | Refills: 3 | Status: SHIPPED | OUTPATIENT
Start: 2024-03-21

## 2024-03-21 NOTE — ASSESSMENT & PLAN NOTE
Patient maintain normal sinus rhythm we will continue with diltiazem 180 mg daily for rate control for breakthrough episodes and she is on Eliquis 5 twice daily for CVA prevention

## 2024-03-21 NOTE — PROGRESS NOTES
"Chief Complaint  Follow-up, Atrial Fibrillation, and Hypertension    Subjective    Patient follows up today and has been having intermittent facial flushing episodes in which her face becomes red she is also noticed that her blood pressure has been elevated during 70s episodes systolically sometimes going up in the 180s range.  She has not had any symptomatic tachycardic problems since being seen last  Past Medical History:   Diagnosis Date    Bunion     Colon polyp 2015    Essential hypertension 08/16/2021    Kidney stone 2023    PAF (paroxysmal atrial fibrillation) 08/16/2021         Current Outpatient Medications:     apixaban (Eliquis) 5 MG tablet tablet, Take 1 tablet by mouth 2 (Two) Times a Day., Disp: 60 tablet, Rfl: 11    BIOTIN PO, Take  by mouth., Disp: , Rfl:     Calcium 500-125 MG-UNIT tablet, Calcium 500 + D (D3) 500 mg(1,250mg) -125 unit oral tablet take 1 tablet by oral route daily   Suspended, Disp: , Rfl:     dilTIAZem CD (CARDIZEM CD) 180 MG 24 hr capsule, Take 1 capsule by mouth Daily., Disp: 90 capsule, Rfl: 3    Omega-3 Fatty Acids (fish oil) 1000 MG capsule capsule, Take 1 capsule by mouth Daily With Breakfast., Disp: , Rfl:     valsartan (Diovan) 80 MG tablet, Take 1 tablet by mouth Daily., Disp: 90 tablet, Rfl: 3    Medications Discontinued During This Encounter   Medication Reason    cyclobenzaprine (FLEXERIL) 5 MG tablet *Therapy completed    apixaban (Eliquis) 5 MG tablet tablet Reorder    dilTIAZem CD (CARDIZEM CD) 180 MG 24 hr capsule Reorder     No Known Allergies     Social History     Tobacco Use    Smoking status: Never    Smokeless tobacco: Never   Vaping Use    Vaping status: Never Used   Substance Use Topics    Alcohol use: Not Currently    Drug use: Never       Family History   Problem Relation Age of Onset    Arrhythmia Sister     Asthma Sister     Arthritis Sister     Asthma Sister         Objective     /68   Pulse 57   Ht 170.2 cm (67\")   Wt 76.7 kg (169 lb)   BMI " "26.47 kg/m²       Physical Exam    General Appearance:   no acute distress  Alert and oriented x3  HENT:   lips not cyanotic  Atraumatic  Neck:  No jvd   supple  Respiratory:  no respiratory distress  normal breath sounds  no rales  Cardiovascular:  Regular rate and rhythm  no S3, no S4   no murmur  no rub  Extremities  No cyanosis  lower extremity edema: none    Skin:   warm, dry  No rashes      Result Review :     No results found for: \"PROBNP\"  CMP          7/5/2023    16:53 1/31/2024    08:36 3/13/2024    14:51   CMP   Glucose 97   96    BUN 17   20    Creatinine 0.82  0.80  0.94    EGFR 80.0  81.9  67.5    Sodium 142   141    Potassium 3.4   4.0    Chloride 102   103    Calcium 9.3   9.6    Total Protein 6.9      Albumin 4.3      Globulin 2.6      Total Bilirubin 0.2      Alkaline Phosphatase 95      AST (SGOT) 19      ALT (SGPT) 16      Albumin/Globulin Ratio 1.7      BUN/Creatinine Ratio 20.7   21.3    Anion Gap 10.0   7.0      CBC w/diff          7/5/2023    16:53   CBC w/Diff   WBC 8.06    RBC 4.28    Hemoglobin 13.3    Hematocrit 39.8    MCV 93.0    MCH 31.1    MCHC 33.4    RDW 12.3    Platelets 273    Neutrophil Rel % 61.5    Immature Granulocyte Rel % 0.2    Lymphocyte Rel % 26.4    Monocyte Rel % 9.4    Eosinophil Rel % 1.5    Basophil Rel % 1.0       Lab Results   Component Value Date    TSH 1.940 12/11/2023      Lab Results   Component Value Date    FREET4 1.07 07/05/2023      No results found for: \"DDIMERQUANT\"  Magnesium   Date Value Ref Range Status   12/07/2022 2.1 1.6 - 2.4 mg/dL Final      No results found for: \"DIGOXIN\"   No results found for: \"TROPONINT\"        Lipid Panel          7/5/2023    16:53   Lipid Panel   Total Cholesterol 219    Triglycerides 124    HDL Cholesterol 56    VLDL Cholesterol 22    LDL Cholesterol  141    LDL/HDL Ratio 2.47      No results found for: \"POCTROP\"        Latest Reference Range & Units 03/13/24 14:51   Sodium 136 - 145 mmol/L 141   Potassium 3.5 - 5.2 mmol/L " 4.0   Chloride 98 - 107 mmol/L 103   CO2 22.0 - 29.0 mmol/L 31.0 (H)   Anion Gap 5.0 - 15.0 mmol/L 7.0   BUN 8 - 23 mg/dL 20   Creatinine 0.57 - 1.00 mg/dL 0.94   BUN/Creatinine Ratio 7.0 - 25.0  21.3   eGFR >60.0 mL/min/1.73 67.5   Glucose 65 - 99 mg/dL 96   Calcium 8.6 - 10.5 mg/dL 9.6   (H): Data is abnormally high              Diagnoses and all orders for this visit:    1. PAF (paroxysmal atrial fibrillation)  Assessment & Plan:  Patient maintain normal sinus rhythm we will continue with diltiazem 180 mg daily for rate control for breakthrough episodes and she is on Eliquis 5 twice daily for CVA prevention    Orders:  -     apixaban (Eliquis) 5 MG tablet tablet; Take 1 tablet by mouth 2 (Two) Times a Day.  Dispense: 60 tablet; Refill: 11    2. Essential hypertension  Assessment & Plan:  Patient with mildly elevated blood pressure at times in addition to low-sodium diet recommended trial of valsartan 80 mg once a day we will repeat a BMP in 2 weeks to make sure potassium stable     Orders:  -     dilTIAZem CD (CARDIZEM CD) 180 MG 24 hr capsule; Take 1 capsule by mouth Daily.  Dispense: 90 capsule; Refill: 3  -     Basic Metabolic Panel; Future  -     Thyroid Panel With TSH; Future    Other orders  -     valsartan (Diovan) 80 MG tablet; Take 1 tablet by mouth Daily.  Dispense: 90 tablet; Refill: 3            Follow Up     Return in about 6 months (around 9/21/2024) for EKG with F/U.          Patient was given instructions and counseling regarding her condition or for health maintenance advice. Please see specific information pulled into the AVS if appropriate.

## 2024-03-21 NOTE — ASSESSMENT & PLAN NOTE
Patient with mildly elevated blood pressure at times in addition to low-sodium diet recommended trial of valsartan 80 mg once a day we will repeat a BMP in 2 weeks to make sure potassium stable

## 2024-08-23 ENCOUNTER — LAB (OUTPATIENT)
Dept: LAB | Facility: HOSPITAL | Age: 64
End: 2024-08-23
Payer: MEDICARE

## 2024-08-23 DIAGNOSIS — I10 ESSENTIAL HYPERTENSION: ICD-10-CM

## 2024-08-23 LAB
ANION GAP SERPL CALCULATED.3IONS-SCNC: 10 MMOL/L (ref 5–15)
BUN SERPL-MCNC: 15 MG/DL (ref 8–23)
BUN/CREAT SERPL: 19 (ref 7–25)
CALCIUM SPEC-SCNC: 9.5 MG/DL (ref 8.6–10.5)
CHLORIDE SERPL-SCNC: 105 MMOL/L (ref 98–107)
CO2 SERPL-SCNC: 27 MMOL/L (ref 22–29)
CREAT SERPL-MCNC: 0.79 MG/DL (ref 0.57–1)
EGFRCR SERPLBLD CKD-EPI 2021: 83.1 ML/MIN/1.73
GLUCOSE SERPL-MCNC: 96 MG/DL (ref 65–99)
POTASSIUM SERPL-SCNC: 4.2 MMOL/L (ref 3.5–5.2)
SODIUM SERPL-SCNC: 142 MMOL/L (ref 136–145)
T-UPTAKE NFR SERPL: 1.12 TBI (ref 0.8–1.3)
T4 SERPL-MCNC: 6.63 MCG/DL (ref 4.5–11.7)
TSH SERPL DL<=0.05 MIU/L-ACNC: 1.35 UIU/ML (ref 0.27–4.2)

## 2024-08-23 PROCEDURE — 84443 ASSAY THYROID STIM HORMONE: CPT

## 2024-08-23 PROCEDURE — 80048 BASIC METABOLIC PNL TOTAL CA: CPT

## 2024-08-23 PROCEDURE — 84436 ASSAY OF TOTAL THYROXINE: CPT

## 2024-08-23 PROCEDURE — 84479 ASSAY OF THYROID (T3 OR T4): CPT

## 2024-08-23 PROCEDURE — 36415 COLL VENOUS BLD VENIPUNCTURE: CPT

## 2024-08-26 ENCOUNTER — TELEPHONE (OUTPATIENT)
Dept: CARDIOLOGY | Facility: CLINIC | Age: 64
End: 2024-08-26
Payer: MEDICARE

## 2024-08-26 NOTE — TELEPHONE ENCOUNTER
----- Message from Amber Molina sent at 8/26/2024  7:50 AM EDT -----  Labs are in normal range, continue current meds

## 2024-09-11 ENCOUNTER — TRANSCRIBE ORDERS (OUTPATIENT)
Dept: ADMINISTRATIVE | Facility: HOSPITAL | Age: 64
End: 2024-09-11
Payer: MEDICARE

## 2024-09-11 DIAGNOSIS — Z12.31 SCREENING MAMMOGRAM FOR BREAST CANCER: Primary | ICD-10-CM

## 2024-09-30 ENCOUNTER — OFFICE VISIT (OUTPATIENT)
Dept: CARDIOLOGY | Facility: CLINIC | Age: 64
End: 2024-09-30
Payer: MEDICARE

## 2024-09-30 VITALS
HEIGHT: 67 IN | HEART RATE: 61 BPM | DIASTOLIC BLOOD PRESSURE: 58 MMHG | WEIGHT: 169 LBS | BODY MASS INDEX: 26.53 KG/M2 | SYSTOLIC BLOOD PRESSURE: 121 MMHG

## 2024-09-30 DIAGNOSIS — I10 ESSENTIAL HYPERTENSION: ICD-10-CM

## 2024-09-30 DIAGNOSIS — I48.0 PAF (PAROXYSMAL ATRIAL FIBRILLATION): Primary | ICD-10-CM

## 2024-09-30 PROCEDURE — 3074F SYST BP LT 130 MM HG: CPT | Performed by: INTERNAL MEDICINE

## 2024-09-30 PROCEDURE — 99214 OFFICE O/P EST MOD 30 MIN: CPT | Performed by: INTERNAL MEDICINE

## 2024-09-30 PROCEDURE — 3078F DIAST BP <80 MM HG: CPT | Performed by: INTERNAL MEDICINE

## 2024-09-30 PROCEDURE — 93000 ELECTROCARDIOGRAM COMPLETE: CPT | Performed by: INTERNAL MEDICINE

## 2024-09-30 NOTE — PROGRESS NOTES
"Chief Complaint  Follow-up, Atrial Fibrillation, and Hypertension    Subjective    Patient has been doing well symptomatically occasional heart fluttering but no sustained tachycardic problems.  Denies any chest discomfort no change in overall breathing ability  Past Medical History:   Diagnosis Date    Bunion     Colon polyp 2015    Essential hypertension 08/16/2021    Kidney stone 2023    PAF (paroxysmal atrial fibrillation) 08/16/2021         Current Outpatient Medications:     apixaban (Eliquis) 5 MG tablet tablet, Take 1 tablet by mouth 2 (Two) Times a Day., Disp: 60 tablet, Rfl: 11    Calcium 500-125 MG-UNIT tablet, Calcium 500 + D (D3) 500 mg(1,250mg) -125 unit oral tablet take 1 tablet by oral route daily   Suspended, Disp: , Rfl:     dilTIAZem CD (CARDIZEM CD) 180 MG 24 hr capsule, Take 1 capsule by mouth Daily., Disp: 90 capsule, Rfl: 3    Omega-3 Fatty Acids (fish oil) 1000 MG capsule capsule, Take 1 capsule by mouth Daily With Breakfast., Disp: , Rfl:     valsartan (Diovan) 80 MG tablet, Take 1 tablet by mouth Daily., Disp: 90 tablet, Rfl: 3    Medications Discontinued During This Encounter   Medication Reason    BIOTIN PO *Therapy completed     No Known Allergies     Social History     Tobacco Use    Smoking status: Never    Smokeless tobacco: Never   Vaping Use    Vaping status: Never Used   Substance Use Topics    Alcohol use: Not Currently    Drug use: Never       Family History   Problem Relation Age of Onset    Arrhythmia Sister     Asthma Sister     Arthritis Sister     Asthma Sister         Objective     /58   Pulse 61   Ht 170.2 cm (67\")   Wt 76.7 kg (169 lb)   BMI 26.47 kg/m²       Physical Exam    General Appearance:   no acute distress  Alert and oriented x3  HENT:   lips not cyanotic  Atraumatic  Neck:  No jvd   supple  Respiratory:  no respiratory distress  normal breath sounds  no rales  Cardiovascular:  Regular rate and rhythm  no S3, no S4   no murmur  no rub  Extremities  No " "cyanosis  lower extremity edema: none    Skin:   warm, dry  No rashes      Result Review :     No results found for: \"PROBNP\"  CMP          1/31/2024    08:36 3/13/2024    14:51 8/23/2024    10:11   CMP   Glucose  96  96    BUN  20  15    Creatinine 0.80  0.94  0.79    EGFR 81.9  67.5  83.1    Sodium  141  142    Potassium  4.0  4.2    Chloride  103  105    Calcium  9.6  9.5    BUN/Creatinine Ratio  21.3  19.0    Anion Gap  7.0  10.0         Lab Results   Component Value Date    TSH 1.350 08/23/2024      Lab Results   Component Value Date    FREET4 1.07 07/05/2023      No results found for: \"DDIMERQUANT\"  Magnesium   Date Value Ref Range Status   12/07/2022 2.1 1.6 - 2.4 mg/dL Final      No results found for: \"DIGOXIN\"   No results found for: \"TROPONINT\"          No results found for: \"POCTROP\"         ECG 12 Lead    Date/Time: 9/30/2024 11:08 AM  Performed by: Juan Potter MD    Authorized by: Juan Potter MD  Comparison: compared with previous ECG   Similar to previous ECG  Rhythm: sinus rhythm  Comments: PACs                 Diagnoses and all orders for this visit:    1. PAF (paroxysmal atrial fibrillation) (Primary)  Assessment & Plan:  Patient maintaining normal sinus rhythm continue with her Eliquis 5 twice daily for CVA prevention and diltiazem 180 daily for rate control.  Counseled patient for avoidance of atrial fibrillation to  Avoid alcohol consumption  Aerobic activity 30 minutes or greater per day 5 times per week  Weight loss        2. Essential hypertension  Assessment & Plan:  Patient with blood pressure at goal range continue on valsartan 80 mg once a day dosing and diltiazem 180 acute     Orders:  -     Basic Metabolic Panel; Future  -     ECG 12 Lead            Follow Up     Return in about 1 year (around 9/30/2025) for EKG with F/U.          Patient was given instructions and counseling regarding her condition or for health maintenance advice. Please see specific information pulled into the AVS " if appropriate.

## 2024-09-30 NOTE — ASSESSMENT & PLAN NOTE
Patient maintaining normal sinus rhythm continue with her Eliquis 5 twice daily for CVA prevention and diltiazem 180 daily for rate control.  Counseled patient for avoidance of atrial fibrillation to  Avoid alcohol consumption  Aerobic activity 30 minutes or greater per day 5 times per week  Weight loss

## 2024-09-30 NOTE — ASSESSMENT & PLAN NOTE
Patient with blood pressure at goal range continue on valsartan 80 mg once a day dosing and diltiazem 180 acute

## 2024-11-01 ENCOUNTER — HOSPITAL ENCOUNTER (OUTPATIENT)
Dept: MAMMOGRAPHY | Facility: HOSPITAL | Age: 64
Discharge: HOME | End: 2024-11-01
Admitting: INTERNAL MEDICINE
Payer: MEDICARE

## 2024-11-01 DIAGNOSIS — Z12.31 SCREENING MAMMOGRAM FOR BREAST CANCER: ICD-10-CM

## 2024-11-01 PROCEDURE — 77063 BREAST TOMOSYNTHESIS BI: CPT

## 2024-11-01 PROCEDURE — 77067 SCR MAMMO BI INCL CAD: CPT

## 2025-02-01 DIAGNOSIS — I48.0 PAF (PAROXYSMAL ATRIAL FIBRILLATION): ICD-10-CM

## 2025-02-03 RX ORDER — APIXABAN 5 MG/1
5 TABLET, FILM COATED ORAL 2 TIMES DAILY
Qty: 60 TABLET | Refills: 1 | Status: SHIPPED | OUTPATIENT
Start: 2025-02-03

## 2025-03-03 RX ORDER — VALSARTAN 80 MG/1
80 TABLET ORAL DAILY
Qty: 90 TABLET | Refills: 3 | Status: SHIPPED | OUTPATIENT
Start: 2025-03-03

## 2025-03-25 DIAGNOSIS — I48.0 PAF (PAROXYSMAL ATRIAL FIBRILLATION): ICD-10-CM

## 2025-03-26 ENCOUNTER — TELEPHONE (OUTPATIENT)
Dept: CARDIOLOGY | Facility: CLINIC | Age: 65
End: 2025-03-26
Payer: MEDICARE

## 2025-03-26 RX ORDER — BENAZEPRIL HYDROCHLORIDE 20 MG/1
20 TABLET ORAL DAILY
Qty: 90 TABLET | Refills: 3 | Status: SHIPPED | OUTPATIENT
Start: 2025-03-26

## 2025-03-26 NOTE — TELEPHONE ENCOUNTER
Please add valsartan to allergy list as intolerance  Recommend switching to benazepril 20 mg daily

## 2025-03-26 NOTE — TELEPHONE ENCOUNTER
Caller: Susana Kim    Relationship: Self    Best call back number: [phone] (home)      Which medication are you concerned about: VALSARTAN 80 MG     Who prescribed you this medication:      When did you start taking this medication: 9/30/24    What are your concerns: COUGH, MUSCLE WEAKNESS  AND JOINT PROBLEMS     How long have you had these concerns: SINCE STARTING MEDICATION

## 2025-03-27 RX ORDER — APIXABAN 5 MG/1
5 TABLET, FILM COATED ORAL 2 TIMES DAILY
Qty: 60 TABLET | Refills: 2 | Status: SHIPPED | OUTPATIENT
Start: 2025-03-27

## 2025-04-07 ENCOUNTER — OFFICE VISIT (OUTPATIENT)
Dept: INTERNAL MEDICINE | Facility: CLINIC | Age: 65
End: 2025-04-07
Payer: MEDICARE

## 2025-04-07 VITALS
BODY MASS INDEX: 26.81 KG/M2 | OXYGEN SATURATION: 98 % | HEART RATE: 60 BPM | TEMPERATURE: 97.2 F | HEIGHT: 67 IN | DIASTOLIC BLOOD PRESSURE: 80 MMHG | SYSTOLIC BLOOD PRESSURE: 132 MMHG | WEIGHT: 170.8 LBS

## 2025-04-07 DIAGNOSIS — R20.0 LEFT ARM NUMBNESS: ICD-10-CM

## 2025-04-07 DIAGNOSIS — M79.642 PAIN OF LEFT HAND: Primary | ICD-10-CM

## 2025-04-07 PROCEDURE — 3075F SYST BP GE 130 - 139MM HG: CPT | Performed by: PHYSICIAN ASSISTANT

## 2025-04-07 PROCEDURE — 1160F RVW MEDS BY RX/DR IN RCRD: CPT | Performed by: PHYSICIAN ASSISTANT

## 2025-04-07 PROCEDURE — 1159F MED LIST DOCD IN RCRD: CPT | Performed by: PHYSICIAN ASSISTANT

## 2025-04-07 PROCEDURE — 99213 OFFICE O/P EST LOW 20 MIN: CPT | Performed by: PHYSICIAN ASSISTANT

## 2025-04-07 PROCEDURE — 3079F DIAST BP 80-89 MM HG: CPT | Performed by: PHYSICIAN ASSISTANT

## 2025-04-07 RX ORDER — VIT C/B6/B5/MAGNESIUM/HERB 173 50-5-6-5MG
CAPSULE ORAL
COMMUNITY
Start: 2025-03-01

## 2025-04-07 NOTE — PROGRESS NOTES
"Chief Complaint  Hand Pain (Left hand pain started in February, denies any injury )    Subjective          Susana Kim presents to [facility]    Left wrist pain- symptoms began a few weeks ago but have migrated down into her left thumb.  She has noticed a \"knot\" on the lateral side of her thumb over the last several days.  Patient has associated numbness in her wrist which sometimes radiates down into her hand and up into her arm.  It is sometimes worse after sleeping all night.  She has not taken any medication for the pain at this time.  Patient has known history of osteoarthritis in other joints.     Objective   Vital Signs:   /80   Pulse 60   Temp 97.2 °F (36.2 °C) (Temporal)   Ht 170.2 cm (67\")   Wt 77.5 kg (170 lb 12.8 oz)   SpO2 98%   BMI 26.75 kg/m²     Physical Exam  Constitutional:       Appearance: Normal appearance.   HENT:      Head: Normocephalic and atraumatic.   Eyes:      Extraocular Movements: Extraocular movements intact.      Conjunctiva/sclera: Conjunctivae normal.      Pupils: Pupils are equal, round, and reactive to light.   Cardiovascular:      Rate and Rhythm: Normal rate and regular rhythm.      Pulses: Normal pulses.      Heart sounds: Normal heart sounds.   Pulmonary:      Effort: Pulmonary effort is normal. No respiratory distress.      Breath sounds: Normal breath sounds.   Musculoskeletal:         General: Swelling (mild thenar) and tenderness (left thenar with subtle protrusion of left MCP joint) present. Normal range of motion.      Cervical back: Normal range of motion and neck supple. No rigidity.      Comments: Positive Tinel sign of left wrist, negative phalen sign   Skin:     General: Skin is warm and dry.      Coloration: Skin is not pale.   Neurological:      General: No focal deficit present.      Mental Status: She is alert and oriented to person, place, and time. Mental status is at baseline.      Gait: " Gait normal.   Psychiatric:         Mood and Affect: Mood normal.         Behavior: Behavior normal.        Result Review :          Procedures      Assessment and Plan    Diagnoses and all orders for this visit:    1. Pain of left hand (Primary)  Assessment & Plan:  Will get wrist and hand xray for further evaluation.  Wrist brace given to patient.  She is going to try voltaren gel.  If symptoms persist or worsen could consider referral to hand specialist.      Orders:  -     XR Wrist 3+ View Left (In Office)  -     XR Hand 3+ View Left (In Office)  -     EMG & Nerve Conduction Test; Future    2. Left arm numbness  Assessment & Plan:  Will order EMG/nerve conduction of left arm. Most concerning for carpal tunnel due to physical exam findings.  Left wrist brace given to patient.  Will treat dependent on results.     Orders:  -     EMG & Nerve Conduction Test; Future              Follow Up   No follow-ups on file.  Patient was given instructions and counseling regarding her condition or for health maintenance advice. Please see specific information pulled into the AVS if appropriate.

## 2025-04-07 NOTE — ASSESSMENT & PLAN NOTE
Will order EMG/nerve conduction of left arm. Most concerning for carpal tunnel due to physical exam findings.  Left wrist brace given to patient.  Will treat dependent on results.

## 2025-04-07 NOTE — ASSESSMENT & PLAN NOTE
Will get wrist and hand xray for further evaluation.  Wrist brace given to patient.  She is going to try voltaren gel.  If symptoms persist or worsen could consider referral to hand specialist.

## 2025-04-07 NOTE — PROGRESS NOTES
I have reviewed the notes, assessments, and/or procedures performed by Sumaya Schmidt PA-C, I concur with her documentation of Susana Kim.

## 2025-04-21 ENCOUNTER — HOSPITAL ENCOUNTER (OUTPATIENT)
Facility: HOSPITAL | Age: 65
Discharge: HOME | End: 2025-04-21
Admitting: PHYSICIAN ASSISTANT
Payer: MEDICARE

## 2025-04-21 DIAGNOSIS — M79.642 PAIN OF LEFT HAND: ICD-10-CM

## 2025-04-21 DIAGNOSIS — R20.0 LEFT ARM NUMBNESS: ICD-10-CM

## 2025-04-21 PROCEDURE — 95886 MUSC TEST DONE W/N TEST COMP: CPT

## 2025-04-21 PROCEDURE — 95909 NRV CNDJ TST 5-6 STUDIES: CPT

## 2025-05-09 ENCOUNTER — PREP FOR SURGERY (OUTPATIENT)
Dept: OTHER | Facility: HOSPITAL | Age: 65
End: 2025-05-09
Payer: MEDICARE

## 2025-05-09 ENCOUNTER — OFFICE VISIT (OUTPATIENT)
Dept: ORTHOPEDIC SURGERY | Facility: CLINIC | Age: 65
End: 2025-05-09
Payer: MEDICARE

## 2025-05-09 VITALS
SYSTOLIC BLOOD PRESSURE: 111 MMHG | BODY MASS INDEX: 26.68 KG/M2 | OXYGEN SATURATION: 96 % | HEIGHT: 67 IN | WEIGHT: 170 LBS | DIASTOLIC BLOOD PRESSURE: 68 MMHG | HEART RATE: 68 BPM

## 2025-05-09 DIAGNOSIS — M65.312 TRIGGER THUMB OF LEFT HAND: ICD-10-CM

## 2025-05-09 DIAGNOSIS — G56.02 CARPAL TUNNEL SYNDROME ON LEFT: ICD-10-CM

## 2025-05-09 DIAGNOSIS — G56.02 CARPAL TUNNEL SYNDROME ON LEFT: Primary | ICD-10-CM

## 2025-05-09 DIAGNOSIS — M79.642 LEFT HAND PAIN: Primary | ICD-10-CM

## 2025-05-09 NOTE — PROGRESS NOTES
"Chief Complaint  Initial Evaluation of the Left Hand     Subjective      Susana Kim presents to [facility] for initial evaluation of the left hand.  She has numbness and tingling of the entire hand.  She has a knot on her thumb in the mckeon side since the end of February.  She has had no injury or fall.  She an EMG and is here to review.  She was given a brace that she wears at bed time.  She has popping of the thumb.      Allergies   Allergen Reactions    Valsartan Other (See Comments)     Cough, muscle weakness         Social History     Socioeconomic History    Marital status:    Tobacco Use    Smoking status: Never    Smokeless tobacco: Never   Vaping Use    Vaping status: Never Used   Substance and Sexual Activity    Alcohol use: Not Currently    Drug use: Never    Sexual activity: Defer        I reviewed the patient's chief complaint, history of present illness, review of systems, past medical history, surgical history, family history, social history, medications, and allergy list.     Review of Systems     Constitutional: Denies fevers, chills, weight loss  Cardiovascular: Denies chest pain, shortness of breath  Skin: Denies rashes, acute skin changes  Neurologic: Denies headache, loss of consciousness      Vital Signs:   /68   Pulse 68   Ht 170.2 cm (67\")   Wt 77.1 kg (170 lb)   SpO2 96%   BMI 26.63 kg/m²          Physical Exam  General: Alert. No acute distress    Ortho Exam        LEFT HAND Positive Compression testing/ Positive Tinels. NegativeFinkelsteins. Negative Hayes's testing. Negative CMC grind testing. Positive Phalens. Full ROM of the hand, fingers, elbow and wrist. Positive Triggering of the digit. Sensation grossly intact to light touch, median, radial and ulnar nerve. Positive AIN, PIN and ulnar nerve motor function intact. Axillary nerve intact. Positive pulses.  Tender over the A1 Pulley.        Procedures        Imaging Results (Most " Recent)       None             Result Review :     IMPRESSION: 1. Evidence of a moderate to severe, primarily demyelinating, focal neuropathy of the left median nerve at the wrist (carpal tunnel). 2. No electrophysiologic evidence of a left cervical radiculopathy, plexopathy, additional focal neuropathy, polyneuropathic changes, myopathy, or motor neuron pathology.     EMG & Nerve Conduction Test  Result Date: 4/22/2025  Narrative: See attached EMG/NCS report. Electronically signed by Kameron Almonte PT, 04/22/25, 9:16 AM EDT.              Assessment and Plan     Diagnoses and all orders for this visit:    1. Left hand pain (Primary)    2. Carpal tunnel syndrome on left    3. Trigger thumb of left hand        Discussed the treatment plan with the patient. I reviewed the EMG with the patient.      Discussed the treatment options with the patient, operative vs non-operative. The patient expressed understanding and wished to proceed with a left carpal tunnel release and a left trigger thumb release.      Discussed surgery., Risks/benefits discussed with patient including, but not limited to: infection, bleeding, neurovascular damage, re-rupture, aesthetic deformity, need for further surgery, and death., Surgery pamphlet given., and Call or return if worsening symptoms.    Follow Up     2 weeks postoperatively.        Patient was given instructions and counseling regarding her condition or for health maintenance advice. Please see specific information pulled into the AVS if appropriate.     Scribed for Mruphy Mcknight MD by Katherine Smith MA.  05/09/25   07:53 EDT    I have personally performed the services described in this document as scribed by the above individual and it is both accurate and complete. Murphy Mcknight MD 05/09/25

## 2025-05-12 ENCOUNTER — TELEPHONE (OUTPATIENT)
Dept: CARDIOLOGY | Facility: CLINIC | Age: 65
End: 2025-05-12
Payer: MEDICARE

## 2025-05-12 NOTE — TELEPHONE ENCOUNTER
Procedure: L Carpal Tunnel Repair    Med Directive: Eliquis    PMH: PAF, HTN    Last Seen: 9/30/2024

## 2025-05-13 NOTE — PRE-PROCEDURE INSTRUCTIONS
PATIENT INSTRUCTED TO BE:    - NOTHING TO EAT AFTER MIDNIGHT OR CHEW, EXCEPT CAN HAVE CLEAR LIQUIDS 2 HOURS PRIOR TO SURGERY ARRIVAL TIME , NO MORE THAN 8 OZ. (NOTHING RED)     - TO HOLD ALL VITAMINS, SUPPLEMENTS, NSAIDS FOR ONE WEEK PRIOR TO THEIR SURGICAL PROCEDURE        - BATHING INSTRUCTIONS GIVEN    INSTRUCTED NO LOTIONS, JEWELRY, PIERCINGS,  NAIL POLISH, OR DEODORANT DAY OF SURGERY        -INSTRUCTED TO TAKE THE FOLLOWING MEDICATIONS THE DAY OF SURGERY WITH SIPS OF WATER:   Diltiazem        - DO NOT BRING ANY MEDICATIONS WITH YOU TO THE HOSPITAL THE DAY OF SURGERY, EXCEPT IF USE INHALERS. BRING INHALERS DAY OF SURGERY       - BRING CPAP OR BIPAP TO THE HOSPITAL ONLY IF YOU ARE SPENDING THE NIGHT    - DO NOT SMOKE OR VAPE 24 HOURS PRIOR TO PROCEDURE PER ANESTHESIA REQUEST     -MAKE SURE YOU HAVE A RIDE HOME OR SOMEONE TO STAY WITH YOU THE DAY OF THE PROCEDURE AFTER YOU GO HOME     - FOLLOW ANY OTHER INSTRUCTIONS GIVEN TO YOU BY YOUR SURGEON'S OFFICE.      COME TO [facility]/ [facility], FIRST FLOOR. CHECK IN AT THE DESK FOR REGISTRATION/SURGERY    - YOU WILL RECEIVE A PHONE CALL THE DAY PRIOR TO SURGERY BETWEEN 1PM AND 4 PM WITH ARRIVAL TIME, IF YOUR SURGERY IS ON A MONDAY YOU WILL RECEIVE A CALL THE FRIDAY PRIOR TO SURGERY DATE    - BRING CASH OR CREDIT CARD FOR COPAYMENT OF MEDICATIONS AFTER SURGERY IF YOU USE THE HOSPITAL PHARMACY (MEDS TO BED)    - PREADMISSION TESTING NURSE LENY ÁLVAREZ [phone] IF HAVE ANY QUESTIONS     -PATIENT PROVIDED THE NUMBER FOR PREOP SURGICAL DEPT IF HAD QUESTIONS AFTER HOURS PRIOR TO SURGERY ([phone]).  INFORMED PT IF NO ANSWER, LEAVE A MESSAGE AND SOMEONE WILL RETURN THEIR CALL       PATIENT VERBALIZED UNDERSTANDING

## 2025-05-14 ENCOUNTER — TELEPHONE (OUTPATIENT)
Dept: ORTHOPEDIC SURGERY | Facility: CLINIC | Age: 65
End: 2025-05-14
Payer: MEDICARE

## 2025-05-14 NOTE — TELEPHONE ENCOUNTER
INFORMED PT, PER DR PETERSEN, PT MAY PROCEED WITH SURGERY AND STOP ELIQUIS 2 DAYS PRIOR TO PROCEDURE.

## 2025-05-15 ENCOUNTER — ANESTHESIA (OUTPATIENT)
Dept: PERIOP | Facility: HOSPITAL | Age: 65
End: 2025-05-15
Payer: MEDICARE

## 2025-05-15 ENCOUNTER — HOSPITAL ENCOUNTER (OUTPATIENT)
Facility: HOSPITAL | Age: 65
Discharge: HOME | End: 2025-05-15
Attending: ORTHOPAEDIC SURGERY | Admitting: ORTHOPAEDIC SURGERY
Payer: MEDICARE

## 2025-05-15 ENCOUNTER — ANESTHESIA EVENT (OUTPATIENT)
Dept: PERIOP | Facility: HOSPITAL | Age: 65
End: 2025-05-15
Payer: MEDICARE

## 2025-05-15 VITALS
RESPIRATION RATE: 16 BRPM | DIASTOLIC BLOOD PRESSURE: 71 MMHG | HEIGHT: 67 IN | HEART RATE: 45 BPM | TEMPERATURE: 97 F | SYSTOLIC BLOOD PRESSURE: 120 MMHG | OXYGEN SATURATION: 96 % | WEIGHT: 167.77 LBS | BODY MASS INDEX: 26.33 KG/M2

## 2025-05-15 DIAGNOSIS — G56.02 CARPAL TUNNEL SYNDROME OF LEFT WRIST: Primary | ICD-10-CM

## 2025-05-15 DIAGNOSIS — G56.02 CARPAL TUNNEL SYNDROME ON LEFT: ICD-10-CM

## 2025-05-15 PROCEDURE — 25010000002 PROPOFOL 10 MG/ML EMULSION

## 2025-05-15 PROCEDURE — 25810000003 LACTATED RINGERS PER 1000 ML: Performed by: ANESTHESIOLOGY

## 2025-05-15 PROCEDURE — 25010000002 LIDOCAINE PF 2% 2 % SOLUTION

## 2025-05-15 PROCEDURE — 25010000002 CEFAZOLIN PER 500 MG: Performed by: ORTHOPAEDIC SURGERY

## 2025-05-15 PROCEDURE — S0260 H&P FOR SURGERY: HCPCS | Performed by: ORTHOPAEDIC SURGERY

## 2025-05-15 PROCEDURE — 26055 INCISE FINGER TENDON SHEATH: CPT | Performed by: ORTHOPAEDIC SURGERY

## 2025-05-15 PROCEDURE — 25010000002 DEXAMETHASONE PER 1 MG

## 2025-05-15 PROCEDURE — 25010000002 ONDANSETRON PER 1 MG

## 2025-05-15 PROCEDURE — 64721 CARPAL TUNNEL SURGERY: CPT | Performed by: ORTHOPAEDIC SURGERY

## 2025-05-15 PROCEDURE — 25010000002 FENTANYL CITRATE (PF) 50 MCG/ML SOLUTION

## 2025-05-15 PROCEDURE — 25010000002 MIDAZOLAM PER 1MG: Performed by: ANESTHESIOLOGY

## 2025-05-15 RX ORDER — PETROLATUM,WHITE
OINTMENT IN PACKET (GRAM) TOPICAL AS NEEDED
Status: DISCONTINUED | OUTPATIENT
Start: 2025-05-15 | End: 2025-05-15 | Stop reason: SURG

## 2025-05-15 RX ORDER — ONDANSETRON 2 MG/ML
4 INJECTION INTRAMUSCULAR; INTRAVENOUS ONCE AS NEEDED
Status: DISCONTINUED | OUTPATIENT
Start: 2025-05-15 | End: 2025-05-15 | Stop reason: HOSPADM

## 2025-05-15 RX ORDER — ONDANSETRON 2 MG/ML
INJECTION INTRAMUSCULAR; INTRAVENOUS AS NEEDED
Status: DISCONTINUED | OUTPATIENT
Start: 2025-05-15 | End: 2025-05-15 | Stop reason: SURG

## 2025-05-15 RX ORDER — MIDAZOLAM HYDROCHLORIDE 2 MG/2ML
2 INJECTION, SOLUTION INTRAMUSCULAR; INTRAVENOUS ONCE
Status: COMPLETED | OUTPATIENT
Start: 2025-05-15 | End: 2025-05-15

## 2025-05-15 RX ORDER — DEXAMETHASONE SODIUM PHOSPHATE 4 MG/ML
INJECTION, SOLUTION INTRA-ARTICULAR; INTRALESIONAL; INTRAMUSCULAR; INTRAVENOUS; SOFT TISSUE AS NEEDED
Status: DISCONTINUED | OUTPATIENT
Start: 2025-05-15 | End: 2025-05-15 | Stop reason: SURG

## 2025-05-15 RX ORDER — HYDROCODONE BITARTRATE AND ACETAMINOPHEN 7.5; 325 MG/1; MG/1
1 TABLET ORAL EVERY 6 HOURS PRN
Qty: 12 TABLET | Refills: 0 | Status: SHIPPED | OUTPATIENT
Start: 2025-05-15

## 2025-05-15 RX ORDER — OXYCODONE HYDROCHLORIDE 5 MG/1
5 TABLET ORAL
Status: DISCONTINUED | OUTPATIENT
Start: 2025-05-15 | End: 2025-05-15 | Stop reason: HOSPADM

## 2025-05-15 RX ORDER — PROPOFOL 10 MG/ML
VIAL (ML) INTRAVENOUS AS NEEDED
Status: DISCONTINUED | OUTPATIENT
Start: 2025-05-15 | End: 2025-05-15 | Stop reason: SURG

## 2025-05-15 RX ORDER — PROMETHAZINE HYDROCHLORIDE 12.5 MG/1
25 TABLET ORAL ONCE AS NEEDED
Status: DISCONTINUED | OUTPATIENT
Start: 2025-05-15 | End: 2025-05-15 | Stop reason: HOSPADM

## 2025-05-15 RX ORDER — FENTANYL CITRATE 50 UG/ML
INJECTION, SOLUTION INTRAMUSCULAR; INTRAVENOUS AS NEEDED
Status: DISCONTINUED | OUTPATIENT
Start: 2025-05-15 | End: 2025-05-15 | Stop reason: SURG

## 2025-05-15 RX ORDER — ACETAMINOPHEN 500 MG
1000 TABLET ORAL ONCE
Status: COMPLETED | OUTPATIENT
Start: 2025-05-15 | End: 2025-05-15

## 2025-05-15 RX ORDER — SODIUM CHLORIDE, SODIUM LACTATE, POTASSIUM CHLORIDE, CALCIUM CHLORIDE 600; 310; 30; 20 MG/100ML; MG/100ML; MG/100ML; MG/100ML
9 INJECTION, SOLUTION INTRAVENOUS CONTINUOUS PRN
Status: DISCONTINUED | OUTPATIENT
Start: 2025-05-15 | End: 2025-05-15 | Stop reason: HOSPADM

## 2025-05-15 RX ORDER — PROMETHAZINE HYDROCHLORIDE 25 MG/1
25 SUPPOSITORY RECTAL ONCE AS NEEDED
Status: DISCONTINUED | OUTPATIENT
Start: 2025-05-15 | End: 2025-05-15 | Stop reason: HOSPADM

## 2025-05-15 RX ORDER — LIDOCAINE HYDROCHLORIDE 20 MG/ML
INJECTION, SOLUTION EPIDURAL; INFILTRATION; INTRACAUDAL; PERINEURAL AS NEEDED
Status: DISCONTINUED | OUTPATIENT
Start: 2025-05-15 | End: 2025-05-15 | Stop reason: SURG

## 2025-05-15 RX ADMIN — ACETAMINOPHEN 1000 MG: 500 TABLET ORAL at 06:43

## 2025-05-15 RX ADMIN — Medication 1 PACKAGE: at 07:18

## 2025-05-15 RX ADMIN — LIDOCAINE HYDROCHLORIDE 60 MG: 20 INJECTION, SOLUTION INTRAVENOUS at 07:15

## 2025-05-15 RX ADMIN — DEXAMETHASONE SODIUM PHOSPHATE 4 MG: 4 INJECTION, SOLUTION INTRAMUSCULAR; INTRAVENOUS at 07:21

## 2025-05-15 RX ADMIN — SODIUM CHLORIDE, POTASSIUM CHLORIDE, SODIUM LACTATE AND CALCIUM CHLORIDE 9 ML/HR: 600; 310; 30; 20 INJECTION, SOLUTION INTRAVENOUS at 06:43

## 2025-05-15 RX ADMIN — FENTANYL CITRATE 25 MCG: 50 INJECTION, SOLUTION INTRAMUSCULAR; INTRAVENOUS at 07:15

## 2025-05-15 RX ADMIN — FENTANYL CITRATE 25 MCG: 50 INJECTION, SOLUTION INTRAMUSCULAR; INTRAVENOUS at 07:34

## 2025-05-15 RX ADMIN — MIDAZOLAM HYDROCHLORIDE 2 MG: 1 INJECTION, SOLUTION INTRAMUSCULAR; INTRAVENOUS at 06:56

## 2025-05-15 RX ADMIN — ONDANSETRON 4 MG: 2 INJECTION INTRAMUSCULAR; INTRAVENOUS at 07:21

## 2025-05-15 RX ADMIN — SODIUM CHLORIDE 2 G: 9 INJECTION, SOLUTION INTRAVENOUS at 07:18

## 2025-05-15 RX ADMIN — PROPOFOL 140 MG: 10 INJECTION, EMULSION INTRAVENOUS at 07:15

## 2025-05-15 NOTE — OP NOTE
CARPAL TUNNEL RELEASE, FINGER TRIGGER RELEASE  Procedure Report    Patient Name:  Susana Kim  YOB: 1960    Date of Surgery:  5/15/2025       Pre-op Diagnosis:   Carpal tunnel syndrome on left [G56.02]     Left trigger thumb  Post-Op Diagnosis Codes:     * Carpal tunnel syndrome on left [G56.02]  Same  Procedure:  Procedure(s):  CARPAL TUNNEL RELEASE  LEFT TRIGGER THUMB FINGER TRIGGER RELEASE    Staff:  Surgeon(s):  Murphy Mcknight MD    Assistant: Renetta Christensen    Anesthesia: General    Estimated Blood Loss: none    Implants:    Nothing was implanted during the procedure    Specimen:          None      Complications: None    Description of Procedure:   LEFT CARPAL TUNNEL RELEASE:   The patient was informed of the risks and benefits.  The patient was taken to the operating room and placed supine after a Lannon block anesthesia was established.  The left hand and upper extremity were prepped and draped satisfactorily using alcohol and ChloraPrep.      A standard incision was made just ulnar to the thenar crease to the thumb, approximately 1.5 cm length, carried down through subcutaneous tissue and fascia, carried down to the palmaris fascia with tenotomy scissors.  Using a 69 blade the transverse carpal ligament was released over the median nerve.  The median nerve had significant signs of compression.  A Bearden elevator was used to protect the nerves during the release and after release had been completed, it was checked proximally and distally and under loupe magnification was satisfactory.  The nerve was inspected and was intact along with the motor branch.  The wound was copiously irrigated.  The skin was closed with horizontal mattress suture nylon and interrupted 3-0 horizontal mattress sutures.   Incision was made over a1 pully of thumb and a1 pully was released without difficulty . Wound was iirigated and closed with nylon dressings were applied the patient tolerated the procedure well  was taken recovery room  Murphy Mcknight MD     Date: 5/15/2025  Time: 08:00 EDT

## 2025-05-15 NOTE — H&P
"Chief Complaint  Initial Evaluation of the Left Hand     Subjective  Susana Kim presents to [facility] for initial evaluation of the left hand.  She has numbness and tingling of the entire hand.  She has a knot on her thumb in the mckeon side since the end of February.  She has had no injury or fall.  She an EMG and is here to review.  She was given a brace that she wears at bed time.  She has popping of the thumb.       Allergies         Allergies   Allergen Reactions    Valsartan Other (See Comments)       Cough, muscle weakness             Social History   Social History           Socioeconomic History    Marital status:    Tobacco Use    Smoking status: Never    Smokeless tobacco: Never   Vaping Use    Vaping status: Never Used   Substance and Sexual Activity    Alcohol use: Not Currently    Drug use: Never    Sexual activity: Defer            I reviewed the patient's chief complaint, history of present illness, review of systems, past medical history, surgical history, family history, social history, medications, and allergy list.      Review of Systems      Constitutional: Denies fevers, chills, weight loss  Cardiovascular: Denies chest pain, shortness of breath  Skin: Denies rashes, acute skin changes  Neurologic: Denies headache, loss of consciousness        Vital Signs:   /68   Pulse 68   Ht 170.2 cm (67\")   Wt 77.1 kg (170 lb)   SpO2 96%   BMI 26.63 kg/m²           Physical Exam  General: Alert. No acute distress     Ortho Exam          LEFT HAND Positive Compression testing/ Positive Tinels. NegativeFinkelsteins. Negative Hayes's testing. Negative CMC grind testing. Positive Phalens. Full ROM of the hand, fingers, elbow and wrist. Positive Triggering of the digit. Sensation grossly intact to light touch, median, radial and ulnar nerve. Positive AIN, PIN and ulnar nerve motor function intact. Axillary nerve intact. Positive pulses.  Tender over the " A1 Kate.          Procedures           Imaging Results (Most Recent)         None                Result Review  :      IMPRESSION: 1. Evidence of a moderate to severe, primarily demyelinating, focal neuropathy of the left median nerve at the wrist (carpal tunnel). 2. No electrophysiologic evidence of a left cervical radiculopathy, plexopathy, additional focal neuropathy, polyneuropathic changes, myopathy, or motor neuron pathology.      EMG & Nerve Conduction Test  Result Date: 4/22/2025  Narrative: See attached EMG/NCS report. Electronically signed by Kameron Almonte PT, 04/22/25, 9:16 AM EDT.            Assessment  Assessment and Plan      Diagnoses and all orders for this visit:     1. Left hand pain (Primary)     2. Carpal tunnel syndrome on left     3. Trigger thumb of left hand           Discussed the treatment plan with the patient. I reviewed the EMG with the patient.       Discussed the treatment options with the patient, operative vs non-operative. The patient expressed understanding and wished to proceed with a left carpal tunnel release and a left trigger thumb release.       Discussed surgery., Risks/benefits discussed with patient including, but not limited to: infection, bleeding, neurovascular damage, re-rupture, aesthetic deformity, need for further surgery, and death., Surgery pamphlet given., and Call or return if worsening symptoms.     Follow Up      2 weeks postoperatively.

## 2025-05-15 NOTE — ANESTHESIA PREPROCEDURE EVALUATION
Anesthesia Evaluation     Patient summary reviewed and Nursing notes reviewed   no history of anesthetic complications:   NPO Solid Status: > 8 hours  NPO Liquid Status: > 2 hours           Airway   Mallampati: II  TM distance: >3 FB  Neck ROM: full  No difficulty expected  Dental      Pulmonary - negative pulmonary ROS and normal exam    breath sounds clear to auscultation  Cardiovascular - normal exam  Exercise tolerance: good (4-7 METS)    Rhythm: regular  Rate: normal    (+) hypertension, dysrhythmias Paroxysmal Atrial Fib    ROS comment: Afib in past.  SR long time, but maintained on Eliquis     Neuro/Psych- negative ROS  GI/Hepatic/Renal/Endo    (+) renal disease- stones    Musculoskeletal     Abdominal    Substance History      OB/GYN          Other        ROS/Med Hx Other: PAT Nursing Notes unavailable.            Stress negative 2022       Anesthesia Plan    ASA 2     general     (Patient understands anesthesia not responsible for dental damage.)  intravenous induction     Anesthetic plan, risks, benefits, and alternatives have been provided, discussed and informed consent has been obtained with: patient.    Plan discussed with CRNA.        CODE STATUS:

## 2025-05-15 NOTE — ANESTHESIA POSTPROCEDURE EVALUATION
Patient: Susana Kim    Procedure Summary       Date: 05/15/25 Room / Location: [facility]  / [facility]    Anesthesia Start: 0711 Anesthesia Stop: 0755    Procedures:       CARPAL TUNNEL RELEASE (Left: Wrist)      LEFT TRIGGER THUMB FINGER TRIGGER RELEASE (Left: Fingers) Diagnosis:       Carpal tunnel syndrome on left      (Carpal tunnel syndrome on left [G56.02])    Surgeons: Murphy Mcknight MD Provider: Colt Mar MD    Anesthesia Type: general ASA Status: 2            Anesthesia Type: general    Vitals  Vitals Value Taken Time   /97 05/15/25 08:27   Temp 36 °C (96.8 °F) 05/15/25 07:51   Pulse 46 05/15/25 08:28   Resp 16 05/15/25 07:51   SpO2 99 % 05/15/25 08:28   Vitals shown include unfiled device data.        Post Anesthesia Care and Evaluation    Patient location during evaluation: bedside  Patient participation: complete - patient participated  Level of consciousness: awake  Pain management: adequate    Airway patency: patent  PONV Status: none  Cardiovascular status: acceptable and stable  Respiratory status: acceptable  Hydration status: acceptable

## 2025-05-15 NOTE — DISCHARGE INSTRUCTIONS
DISCHARGE INSTRUCTIONS  CARPAL TUNNEL RELEASE      For your surgery you had:  General anesthesia (you may have a sore throat for the first 24 hours)    Local anesthesia  Monitored anesthesia care      You may experience dizziness, drowsiness, or lightheadedness for several hours following surgery.  Do not stay alone today or tonight.  Limit your activity for 24 hours.  Resume your diet slowly.    You should not drive or operate machinery, drink alcohol, or sign legally binding documents for 24 hours or while you are taking pain medication.  Elevate the arm so that the hand and wrist are above the level of the heart. Elevate affected arm on a pillow when resting.   Use ice to affected area for 48-72 hours. Apply 20 minutes on - 20 minutes off. Do NOT apply directly to skin.  Exercise fingers frequently by making a full fist and fully straightening the fingers. This will help prevent swelling and stiffness.  Do NOT do any heavy lifting, pulling or strenuous activities using the affected hand. [] Keep splint clean and dry.      [] Do NOT submerge in water.  [] Keep incision area clean and dry.  [] You may shower or bathe but keep splint dry     .  NOTIFY YOUR DOCTOR IF YOU EXPERIENCE ANY OF THE FOLLOWING:  Temperature greater than 101 degrees Fahrenheit  Shaking Chills  Redness or excessive drainage from incision  Nausea, vomiting and/or pain that is not controlled by prescribed medications  Increase in bleeding or bleeding that is excessive  Unable to urinate in 6 hours after surgery  If unable to reach your doctor, please go to the closest Emergency Room  Last dose of pain medication was given at:   .  You should see   for follow-up care   on   .  Phone number:      SPECIAL INSTRUCTIONS:               I have read and received the above instructions.

## 2025-05-30 ENCOUNTER — OFFICE VISIT (OUTPATIENT)
Dept: ORTHOPEDIC SURGERY | Facility: CLINIC | Age: 65
End: 2025-05-30
Payer: MEDICARE

## 2025-05-30 VITALS — OXYGEN SATURATION: 95 % | SYSTOLIC BLOOD PRESSURE: 134 MMHG | DIASTOLIC BLOOD PRESSURE: 69 MMHG | HEART RATE: 71 BPM

## 2025-05-30 DIAGNOSIS — Z98.890 S/P CARPAL TUNNEL RELEASE: Primary | ICD-10-CM

## 2025-05-30 DIAGNOSIS — Z98.890 S/P TRIGGER FINGER RELEASE: ICD-10-CM

## 2025-05-30 NOTE — PATIENT INSTRUCTIONS
Sutures removed in office without issue.  Patient was educated on incision care and will call with concerns. Please keep incision clean and dry. Do not soak or submerge in water until incision is fully healed.  Do not apply creams or lotions over the incision. Continue icing and elevation as needed to help with pain and swelling.  Ice up to 3 or 4 times daily for no longer than 15 to 20 minutes at a time.    Patient advised on return to carpal tunnel brace(s) nightly and with activity. Patient has brace at home.. Okay to place guaze over incision while wearing brace to avoid irritation. Avoid repetitive ROM of the hand or wrist.  No heavy lifting, pushing, or pulling until incision is fully healed.  Home exercises provided today. We discussed ordering formal occupational therapy, she will contact office if decides she needs this.       Follow-up in 4 weeks. No imaging needed. Please call with questions or concerns.

## 2025-06-11 DIAGNOSIS — I10 ESSENTIAL HYPERTENSION: ICD-10-CM

## 2025-06-11 RX ORDER — DILTIAZEM HYDROCHLORIDE 180 MG/1
180 CAPSULE, COATED, EXTENDED RELEASE ORAL DAILY
Qty: 90 CAPSULE | Refills: 3 | Status: SHIPPED | OUTPATIENT
Start: 2025-06-11

## 2025-06-13 DIAGNOSIS — I48.0 PAF (PAROXYSMAL ATRIAL FIBRILLATION): ICD-10-CM

## 2025-06-15 RX ORDER — APIXABAN 5 MG/1
5 TABLET, FILM COATED ORAL 2 TIMES DAILY
Qty: 180 TABLET | Refills: 3 | Status: SHIPPED | OUTPATIENT
Start: 2025-06-15

## (undated) DEVICE — STERILE POLYISOPRENE POWDER-FREE SURGICAL GLOVES WITH EMOLLIENT COATING: Brand: PROTEXIS

## (undated) DEVICE — BNDG ELAS ECON W/CLIP 4IN 5YD LF STRL

## (undated) DEVICE — ENCORE® LATEX ORTHO SIZE 8, STERILE LATEX POWDER-FREE SURGICAL GLOVE: Brand: ENCORE

## (undated) DEVICE — BNDG ESMARK 4IN 12FT LF STRL BLU

## (undated) DEVICE — EXTREMITY-LF: Brand: MEDLINE INDUSTRIES, INC.

## (undated) DEVICE — SYR LL TP 10ML STRL

## (undated) DEVICE — THE STERILE LIGHT HANDLE COVER IS USED WITH STERIS SURGICAL LIGHTING AND VISUALIZATION SYSTEMS.

## (undated) DEVICE — DRESSING,GAUZE,XEROFORM,CURAD,1"X8",ST: Brand: CURAD

## (undated) DEVICE — HYPODERMIC SAFETY NEEDLE: Brand: MONOJECT

## (undated) DEVICE — UNDERCAST PADDING: Brand: DEROYAL

## (undated) DEVICE — STERILE POLYISOPRENE POWDER-FREE SURGICAL GLOVES: Brand: PROTEXIS

## (undated) DEVICE — BNDG COMPR S/ADHR 1IN 5YD TN NS

## (undated) DEVICE — BNDG GZ SOF-FORM CONFRM 2X75IN LF STRL

## (undated) DEVICE — BLD OPTH BEAVER/MINIBLADE STR 180DEG DBL/BVL SS

## (undated) DEVICE — INTENDED FOR TISSUE SEPARATION, AND OTHER PROCEDURES THAT REQUIRE A SHARP SURGICAL BLADE TO PUNCTURE OR CUT.: Brand: BARD-PARKER ® CARBON RIB-BACK BLADES

## (undated) DEVICE — GLOVE,SURG,SENSICARE SLT,LF,PF,7: Brand: MEDLINE

## (undated) DEVICE — APPL CHLORAPREP HI/LITE 26ML ORNG

## (undated) DEVICE — GAUZE,SPONGE,4"X4",16PLY,STRL,LF,10/TRAY: Brand: MEDLINE

## (undated) DEVICE — SUT ETHLN 4/0 FS2 18IN 662H